# Patient Record
Sex: MALE | Race: WHITE | NOT HISPANIC OR LATINO | Employment: OTHER | URBAN - METROPOLITAN AREA
[De-identification: names, ages, dates, MRNs, and addresses within clinical notes are randomized per-mention and may not be internally consistent; named-entity substitution may affect disease eponyms.]

---

## 2017-02-25 ENCOUNTER — HOSPITAL ENCOUNTER (EMERGENCY)
Facility: HOSPITAL | Age: 42
Discharge: HOME | End: 2017-02-25
Attending: EMERGENCY MEDICINE | Admitting: EMERGENCY MEDICINE

## 2017-02-25 VITALS
HEART RATE: 118 BPM | SYSTOLIC BLOOD PRESSURE: 157 MMHG | BODY MASS INDEX: 23.14 KG/M2 | DIASTOLIC BLOOD PRESSURE: 85 MMHG | WEIGHT: 190 LBS | HEIGHT: 76 IN | TEMPERATURE: 96.5 F | OXYGEN SATURATION: 94 % | RESPIRATION RATE: 16 BRPM

## 2017-02-25 DIAGNOSIS — B86 SCABIES: Primary | ICD-10-CM

## 2017-02-25 PROCEDURE — 99282 EMERGENCY DEPT VISIT SF MDM: CPT

## 2017-02-25 RX ORDER — METHADONE HYDROCHLORIDE 10 MG/5ML
115 SOLUTION ORAL DAILY
COMMUNITY

## 2017-02-25 RX ORDER — INSULIN GLARGINE 100 [IU]/ML
20 INJECTION, SOLUTION SUBCUTANEOUS
COMMUNITY
End: 2017-07-04 | Stop reason: HOSPADM

## 2017-02-25 RX ORDER — PERMETHRIN 50 MG/G
1 CREAM TOPICAL ONCE
Qty: 30 G | Refills: 0 | Status: SHIPPED | OUTPATIENT
Start: 2017-02-25 | End: 2017-02-25

## 2017-07-03 ENCOUNTER — HOSPITAL ENCOUNTER (OUTPATIENT)
Facility: HOSPITAL | Age: 42
Setting detail: OBSERVATION
Discharge: HOME | End: 2017-07-04
Attending: EMERGENCY MEDICINE | Admitting: INTERNAL MEDICINE
Payer: COMMERCIAL

## 2017-07-03 DIAGNOSIS — Z91.89 AT RISK FOR HYPOGLYCEMIA: ICD-10-CM

## 2017-07-03 DIAGNOSIS — R11.10 INTRACTABLE VOMITING: Primary | ICD-10-CM

## 2017-07-03 PROBLEM — F11.20 NARCOTIC DEPENDENCY, CONTINUOUS (HCC): Status: ACTIVE | Noted: 2017-07-03

## 2017-07-03 PROBLEM — E16.2 HYPOGLYCEMIA: Status: ACTIVE | Noted: 2017-07-03

## 2017-07-03 PROBLEM — E10.9 TYPE 1 DIABETES MELLITUS (HCC): Status: ACTIVE | Noted: 2017-07-03

## 2017-07-03 PROBLEM — K52.9 GASTROENTERITIS, ACUTE: Status: ACTIVE | Noted: 2017-07-03

## 2017-07-03 LAB
ALBUMIN SERPL BCP-MCNC: 4.5 G/DL (ref 3.5–5)
ALP SERPL-CCNC: 120 U/L (ref 46–116)
ALT SERPL W P-5'-P-CCNC: 62 U/L (ref 12–78)
ANION GAP SERPL CALCULATED.3IONS-SCNC: 13 MMOL/L (ref 4–13)
AST SERPL W P-5'-P-CCNC: 48 U/L (ref 5–45)
BASOPHILS # BLD AUTO: 0.03 THOUSANDS/ΜL (ref 0–0.1)
BASOPHILS NFR BLD AUTO: 0 % (ref 0–1)
BILIRUB SERPL-MCNC: 0.4 MG/DL (ref 0.2–1)
BUN SERPL-MCNC: 8 MG/DL (ref 5–25)
CALCIUM SERPL-MCNC: 9.5 MG/DL (ref 8.3–10.1)
CHLORIDE SERPL-SCNC: 100 MMOL/L (ref 100–108)
CO2 SERPL-SCNC: 29 MMOL/L (ref 21–32)
CREAT SERPL-MCNC: 0.81 MG/DL (ref 0.6–1.3)
EOSINOPHIL # BLD AUTO: 0.02 THOUSAND/ΜL (ref 0–0.61)
EOSINOPHIL NFR BLD AUTO: 0 % (ref 0–6)
ERYTHROCYTE [DISTWIDTH] IN BLOOD BY AUTOMATED COUNT: 12.6 % (ref 11.6–15.1)
GFR SERPL CREATININE-BSD FRML MDRD: >60 ML/MIN/1.73SQ M
GLUCOSE SERPL-MCNC: 108 MG/DL (ref 65–140)
GLUCOSE SERPL-MCNC: 109 MG/DL (ref 65–140)
GLUCOSE SERPL-MCNC: 190 MG/DL (ref 65–140)
GLUCOSE SERPL-MCNC: 200 MG/DL (ref 65–140)
GLUCOSE SERPL-MCNC: 235 MG/DL (ref 65–140)
GLUCOSE SERPL-MCNC: 60 MG/DL (ref 65–140)
GLUCOSE SERPL-MCNC: 71 MG/DL (ref 65–140)
GLUCOSE SERPL-MCNC: 75 MG/DL (ref 65–140)
GLUCOSE SERPL-MCNC: 93 MG/DL (ref 65–140)
HCT VFR BLD AUTO: 47.8 % (ref 36.5–49.3)
HGB BLD-MCNC: 16.7 G/DL (ref 12–17)
LIPASE SERPL-CCNC: 48 U/L (ref 73–393)
LYMPHOCYTES # BLD AUTO: 1.43 THOUSANDS/ΜL (ref 0.6–4.47)
LYMPHOCYTES NFR BLD AUTO: 17 % (ref 14–44)
MCH RBC QN AUTO: 31.2 PG (ref 26.8–34.3)
MCHC RBC AUTO-ENTMCNC: 34.9 G/DL (ref 31.4–37.4)
MCV RBC AUTO: 89 FL (ref 82–98)
MONOCYTES # BLD AUTO: 0.42 THOUSAND/ΜL (ref 0.17–1.22)
MONOCYTES NFR BLD AUTO: 5 % (ref 4–12)
NEUTROPHILS # BLD AUTO: 6.31 THOUSANDS/ΜL (ref 1.85–7.62)
NEUTS SEG NFR BLD AUTO: 78 % (ref 43–75)
PLATELET # BLD AUTO: 280 THOUSANDS/UL (ref 149–390)
PMV BLD AUTO: 10 FL (ref 8.9–12.7)
POTASSIUM SERPL-SCNC: 3.7 MMOL/L (ref 3.5–5.3)
PROT SERPL-MCNC: 8.3 G/DL (ref 6.4–8.2)
RBC # BLD AUTO: 5.36 MILLION/UL (ref 3.88–5.62)
SODIUM SERPL-SCNC: 142 MMOL/L (ref 136–145)
WBC # BLD AUTO: 8.21 THOUSAND/UL (ref 4.31–10.16)

## 2017-07-03 PROCEDURE — 96376 TX/PRO/DX INJ SAME DRUG ADON: CPT

## 2017-07-03 PROCEDURE — 36415 COLL VENOUS BLD VENIPUNCTURE: CPT | Performed by: EMERGENCY MEDICINE

## 2017-07-03 PROCEDURE — 82948 REAGENT STRIP/BLOOD GLUCOSE: CPT

## 2017-07-03 PROCEDURE — 85025 COMPLETE CBC W/AUTO DIFF WBC: CPT | Performed by: EMERGENCY MEDICINE

## 2017-07-03 PROCEDURE — 80053 COMPREHEN METABOLIC PANEL: CPT | Performed by: EMERGENCY MEDICINE

## 2017-07-03 PROCEDURE — 96361 HYDRATE IV INFUSION ADD-ON: CPT

## 2017-07-03 PROCEDURE — 83690 ASSAY OF LIPASE: CPT | Performed by: EMERGENCY MEDICINE

## 2017-07-03 PROCEDURE — 96375 TX/PRO/DX INJ NEW DRUG ADDON: CPT

## 2017-07-03 PROCEDURE — 99285 EMERGENCY DEPT VISIT HI MDM: CPT

## 2017-07-03 PROCEDURE — 96374 THER/PROPH/DIAG INJ IV PUSH: CPT

## 2017-07-03 PROCEDURE — 93005 ELECTROCARDIOGRAM TRACING: CPT | Performed by: EMERGENCY MEDICINE

## 2017-07-03 RX ORDER — DEXTROSE MONOHYDRATE 25 G/50ML
INJECTION, SOLUTION INTRAVENOUS
Status: COMPLETED
Start: 2017-07-03 | End: 2017-07-03

## 2017-07-03 RX ORDER — DEXTROSE MONOHYDRATE 25 G/50ML
25 INJECTION, SOLUTION INTRAVENOUS ONCE
Status: COMPLETED | OUTPATIENT
Start: 2017-07-03 | End: 2017-07-03

## 2017-07-03 RX ORDER — ONDANSETRON 2 MG/ML
4 INJECTION INTRAMUSCULAR; INTRAVENOUS ONCE
Status: COMPLETED | OUTPATIENT
Start: 2017-07-03 | End: 2017-07-03

## 2017-07-03 RX ORDER — ONDANSETRON 2 MG/ML
4 INJECTION INTRAMUSCULAR; INTRAVENOUS EVERY 6 HOURS PRN
Status: DISCONTINUED | OUTPATIENT
Start: 2017-07-03 | End: 2017-07-04 | Stop reason: HOSPADM

## 2017-07-03 RX ORDER — METHADONE HYDROCHLORIDE 10 MG/1
110 TABLET ORAL DAILY
Status: DISCONTINUED | OUTPATIENT
Start: 2017-07-03 | End: 2017-07-04 | Stop reason: HOSPADM

## 2017-07-03 RX ORDER — DEXTROSE AND SODIUM CHLORIDE 5; .9 G/100ML; G/100ML
75 INJECTION, SOLUTION INTRAVENOUS CONTINUOUS
Status: DISCONTINUED | OUTPATIENT
Start: 2017-07-03 | End: 2017-07-04 | Stop reason: HOSPADM

## 2017-07-03 RX ORDER — DIPHENHYDRAMINE HYDROCHLORIDE 50 MG/ML
25 INJECTION INTRAMUSCULAR; INTRAVENOUS ONCE
Status: COMPLETED | OUTPATIENT
Start: 2017-07-03 | End: 2017-07-03

## 2017-07-03 RX ORDER — DEXTROSE MONOHYDRATE 25 G/50ML
50 INJECTION, SOLUTION INTRAVENOUS ONCE
Status: COMPLETED | OUTPATIENT
Start: 2017-07-03 | End: 2017-07-03

## 2017-07-03 RX ORDER — METOCLOPRAMIDE HYDROCHLORIDE 5 MG/ML
10 INJECTION INTRAMUSCULAR; INTRAVENOUS ONCE
Status: COMPLETED | OUTPATIENT
Start: 2017-07-03 | End: 2017-07-03

## 2017-07-03 RX ORDER — NICOTINE 21 MG/24HR
1 PATCH, TRANSDERMAL 24 HOURS TRANSDERMAL DAILY
Status: DISCONTINUED | OUTPATIENT
Start: 2017-07-03 | End: 2017-07-04 | Stop reason: HOSPADM

## 2017-07-03 RX ADMIN — DEXTROSE MONOHYDRATE 25 ML: 25 INJECTION, SOLUTION INTRAVENOUS at 10:20

## 2017-07-03 RX ADMIN — DEXTROSE AND SODIUM CHLORIDE 125 ML/HR: 5; .9 INJECTION, SOLUTION INTRAVENOUS at 13:02

## 2017-07-03 RX ADMIN — DEXTROSE MONOHYDRATE 50 ML: 25 INJECTION, SOLUTION INTRAVENOUS at 13:01

## 2017-07-03 RX ADMIN — SODIUM CHLORIDE 1000 ML: 0.9 INJECTION, SOLUTION INTRAVENOUS at 10:20

## 2017-07-03 RX ADMIN — METHADONE HYDROCHLORIDE 110 MG: 10 TABLET ORAL at 17:05

## 2017-07-03 RX ADMIN — SODIUM CHLORIDE 1000 ML: 0.9 INJECTION, SOLUTION INTRAVENOUS at 11:46

## 2017-07-03 RX ADMIN — DEXTROSE AND SODIUM CHLORIDE 125 ML/HR: 5; .9 INJECTION, SOLUTION INTRAVENOUS at 17:20

## 2017-07-03 RX ADMIN — NICOTINE 1 PATCH: 14 PATCH, EXTENDED RELEASE TRANSDERMAL at 17:06

## 2017-07-03 RX ADMIN — METOCLOPRAMIDE 10 MG: 5 INJECTION, SOLUTION INTRAMUSCULAR; INTRAVENOUS at 11:46

## 2017-07-03 RX ADMIN — ONDANSETRON 4 MG: 2 INJECTION INTRAMUSCULAR; INTRAVENOUS at 10:20

## 2017-07-03 RX ADMIN — DIPHENHYDRAMINE HYDROCHLORIDE 25 MG: 50 INJECTION, SOLUTION INTRAMUSCULAR; INTRAVENOUS at 11:55

## 2017-07-03 RX ADMIN — ENOXAPARIN SODIUM 40 MG: 40 INJECTION SUBCUTANEOUS at 17:05

## 2017-07-03 RX ADMIN — ONDANSETRON 4 MG: 2 INJECTION INTRAMUSCULAR; INTRAVENOUS at 11:05

## 2017-07-04 VITALS
WEIGHT: 205.25 LBS | HEIGHT: 76 IN | DIASTOLIC BLOOD PRESSURE: 76 MMHG | OXYGEN SATURATION: 98 % | TEMPERATURE: 98 F | RESPIRATION RATE: 20 BRPM | BODY MASS INDEX: 24.99 KG/M2 | SYSTOLIC BLOOD PRESSURE: 121 MMHG | HEART RATE: 72 BPM

## 2017-07-04 LAB
ATRIAL RATE: 89 BPM
GLUCOSE SERPL-MCNC: 106 MG/DL (ref 65–140)
GLUCOSE SERPL-MCNC: 173 MG/DL (ref 65–140)
GLUCOSE SERPL-MCNC: 56 MG/DL (ref 65–140)
GLUCOSE SERPL-MCNC: 81 MG/DL (ref 65–140)
P AXIS: 70 DEGREES
PR INTERVAL: 140 MS
QRS AXIS: 83 DEGREES
QRSD INTERVAL: 82 MS
QT INTERVAL: 366 MS
QTC INTERVAL: 445 MS
T WAVE AXIS: 64 DEGREES
VENTRICULAR RATE: 89 BPM

## 2017-07-04 PROCEDURE — 82948 REAGENT STRIP/BLOOD GLUCOSE: CPT

## 2017-07-04 RX ORDER — ONDANSETRON 4 MG/1
4 TABLET, FILM COATED ORAL EVERY 8 HOURS PRN
Qty: 12 TABLET | Refills: 0 | Status: SHIPPED | OUTPATIENT
Start: 2017-07-04 | End: 2017-10-15

## 2017-10-15 ENCOUNTER — HOSPITAL ENCOUNTER (EMERGENCY)
Facility: HOSPITAL | Age: 42
Discharge: HOME | End: 2017-10-15
Admitting: EMERGENCY MEDICINE
Payer: COMMERCIAL

## 2017-10-15 VITALS
BODY MASS INDEX: 23.14 KG/M2 | OXYGEN SATURATION: 99 % | WEIGHT: 190 LBS | DIASTOLIC BLOOD PRESSURE: 88 MMHG | HEIGHT: 76 IN | RESPIRATION RATE: 20 BRPM | TEMPERATURE: 97.3 F | SYSTOLIC BLOOD PRESSURE: 145 MMHG | HEART RATE: 74 BPM

## 2017-10-15 DIAGNOSIS — R11.2 NAUSEA AND VOMITING: Primary | ICD-10-CM

## 2017-10-15 DIAGNOSIS — R74.01 TRANSAMINITIS: ICD-10-CM

## 2017-10-15 LAB
ACETONE SERPL-MCNC: NEGATIVE MG/DL
ALBUMIN SERPL BCP-MCNC: 4.6 G/DL (ref 3.5–5)
ALP SERPL-CCNC: 116 U/L (ref 46–116)
ALT SERPL W P-5'-P-CCNC: 160 U/L (ref 12–78)
ANION GAP SERPL CALCULATED.3IONS-SCNC: 13 MMOL/L (ref 4–13)
AST SERPL W P-5'-P-CCNC: 81 U/L (ref 5–45)
BACTERIA UR QL AUTO: ABNORMAL /HPF
BASOPHILS # BLD AUTO: 0.03 THOUSANDS/ΜL (ref 0–0.1)
BASOPHILS NFR BLD AUTO: 1 % (ref 0–1)
BILIRUB SERPL-MCNC: 0.8 MG/DL (ref 0.2–1)
BILIRUB UR QL STRIP: NEGATIVE
BUN SERPL-MCNC: 8 MG/DL (ref 5–25)
CALCIUM SERPL-MCNC: 9 MG/DL (ref 8.3–10.1)
CHLORIDE SERPL-SCNC: 92 MMOL/L (ref 100–108)
CLARITY UR: CLEAR
CO2 SERPL-SCNC: 32 MMOL/L (ref 21–32)
COLOR UR: YELLOW
CREAT SERPL-MCNC: 0.83 MG/DL (ref 0.6–1.3)
EOSINOPHIL # BLD AUTO: 0 THOUSAND/ΜL (ref 0–0.61)
EOSINOPHIL NFR BLD AUTO: 0 % (ref 0–6)
ERYTHROCYTE [DISTWIDTH] IN BLOOD BY AUTOMATED COUNT: 14 % (ref 11.6–15.1)
GFR SERPL CREATININE-BSD FRML MDRD: 111 ML/MIN/1.73SQ M
GLUCOSE SERPL-MCNC: 241 MG/DL (ref 65–140)
GLUCOSE SERPL-MCNC: 244 MG/DL (ref 65–140)
GLUCOSE UR STRIP-MCNC: ABNORMAL MG/DL
HCT VFR BLD AUTO: 44.9 % (ref 36.5–49.3)
HGB BLD-MCNC: 15.7 G/DL (ref 12–17)
HGB UR QL STRIP.AUTO: NEGATIVE
KETONES UR STRIP-MCNC: ABNORMAL MG/DL
LEUKOCYTE ESTERASE UR QL STRIP: ABNORMAL
LYMPHOCYTES # BLD AUTO: 0.7 THOUSANDS/ΜL (ref 0.6–4.47)
LYMPHOCYTES NFR BLD AUTO: 14 % (ref 14–44)
MCH RBC QN AUTO: 32 PG (ref 26.8–34.3)
MCHC RBC AUTO-ENTMCNC: 35 G/DL (ref 31.4–37.4)
MCV RBC AUTO: 91 FL (ref 82–98)
MONOCYTES # BLD AUTO: 0.3 THOUSAND/ΜL (ref 0.17–1.22)
MONOCYTES NFR BLD AUTO: 6 % (ref 4–12)
MUCOUS THREADS UR QL AUTO: ABNORMAL
NEUTROPHILS # BLD AUTO: 3.87 THOUSANDS/ΜL (ref 1.85–7.62)
NEUTS SEG NFR BLD AUTO: 79 % (ref 43–75)
NITRITE UR QL STRIP: NEGATIVE
NON-SQ EPI CELLS URNS QL MICRO: ABNORMAL /HPF
PH UR STRIP.AUTO: 7.5 [PH] (ref 4.5–8)
PLATELET # BLD AUTO: 223 THOUSANDS/UL (ref 149–390)
PMV BLD AUTO: 10.3 FL (ref 8.9–12.7)
POTASSIUM SERPL-SCNC: 3.8 MMOL/L (ref 3.5–5.3)
PROT SERPL-MCNC: 8.6 G/DL (ref 6.4–8.2)
PROT UR STRIP-MCNC: ABNORMAL MG/DL
RBC # BLD AUTO: 4.91 MILLION/UL (ref 3.88–5.62)
RBC #/AREA URNS AUTO: ABNORMAL /HPF
SODIUM SERPL-SCNC: 137 MMOL/L (ref 136–145)
SP GR UR STRIP.AUTO: 1.01 (ref 1–1.03)
UROBILINOGEN UR QL STRIP.AUTO: 1 E.U./DL
WBC # BLD AUTO: 4.9 THOUSAND/UL (ref 4.31–10.16)
WBC #/AREA URNS AUTO: ABNORMAL /HPF

## 2017-10-15 PROCEDURE — 96376 TX/PRO/DX INJ SAME DRUG ADON: CPT

## 2017-10-15 PROCEDURE — 36415 COLL VENOUS BLD VENIPUNCTURE: CPT | Performed by: PHYSICIAN ASSISTANT

## 2017-10-15 PROCEDURE — 80053 COMPREHEN METABOLIC PANEL: CPT | Performed by: PHYSICIAN ASSISTANT

## 2017-10-15 PROCEDURE — 99283 EMERGENCY DEPT VISIT LOW MDM: CPT

## 2017-10-15 PROCEDURE — 85025 COMPLETE CBC W/AUTO DIFF WBC: CPT | Performed by: PHYSICIAN ASSISTANT

## 2017-10-15 PROCEDURE — 96374 THER/PROPH/DIAG INJ IV PUSH: CPT

## 2017-10-15 PROCEDURE — 96361 HYDRATE IV INFUSION ADD-ON: CPT

## 2017-10-15 PROCEDURE — 82948 REAGENT STRIP/BLOOD GLUCOSE: CPT

## 2017-10-15 PROCEDURE — 81001 URINALYSIS AUTO W/SCOPE: CPT | Performed by: PHYSICIAN ASSISTANT

## 2017-10-15 PROCEDURE — 82009 KETONE BODYS QUAL: CPT | Performed by: PHYSICIAN ASSISTANT

## 2017-10-15 RX ORDER — ONDANSETRON 2 MG/ML
4 INJECTION INTRAMUSCULAR; INTRAVENOUS ONCE
Status: COMPLETED | OUTPATIENT
Start: 2017-10-15 | End: 2017-10-15

## 2017-10-15 RX ORDER — ONDANSETRON 4 MG/1
4 TABLET, ORALLY DISINTEGRATING ORAL EVERY 4 HOURS PRN
Qty: 20 TABLET | Refills: 0 | Status: SHIPPED | OUTPATIENT
Start: 2017-10-15 | End: 2017-10-27 | Stop reason: SDUPTHER

## 2017-10-15 RX ADMIN — ONDANSETRON 4 MG: 2 INJECTION INTRAMUSCULAR; INTRAVENOUS at 10:43

## 2017-10-15 RX ADMIN — SODIUM CHLORIDE 1000 ML: 0.9 INJECTION, SOLUTION INTRAVENOUS at 10:40

## 2017-10-15 RX ADMIN — SODIUM CHLORIDE 1000 ML: 0.9 INJECTION, SOLUTION INTRAVENOUS at 11:36

## 2017-10-15 RX ADMIN — ONDANSETRON 4 MG: 2 INJECTION INTRAMUSCULAR; INTRAVENOUS at 11:35

## 2017-10-15 NOTE — DISCHARGE INSTRUCTIONS
Rest, clear liquid diet for next 24 hours, then slowly advance food  Take zofran as needed for nausea, monitor your blood sugar closely  Return to ER if symptoms worsen or fevers  Follow up with family doctor for recheck of liver function tests  Acute Nausea and Vomiting   WHAT YOU NEED TO KNOW:   Acute nausea and vomiting start suddenly, worsen quickly, and last a short time  DISCHARGE INSTRUCTIONS:   Return to the emergency department if:   · You see blood in your vomit or your bowel movements  · You have sudden, severe pain in your chest and upper abdomen after hard vomiting or retching  · You have swelling in your neck and chest      · You are dizzy, cold, and thirsty and your eyes and mouth are dry  · You are urinating very little or not at all  · You have muscle weakness, leg cramps, and trouble breathing  · Your heart is beating much faster than normal      · You continue to vomit for more than 48 hours  Contact your healthcare provider if:   · You have frequent dry heaves (vomiting but nothing comes out)  · Your nausea and vomiting does not get better or go away after you use medicine  · You have questions or concerns about your condition or treatment  Medicines: You may need any of the following:  · Medicines  may be given to calm your stomach and stop your vomiting  You may also need medicines to help you feel more relaxed or to stop nausea and vomiting caused by motion sickness  · Gastrointestinal stimulants  are used to help empty your stomach and bowels  This may help decrease nausea and vomiting  · Take your medicine as directed  Contact your healthcare provider if you think your medicine is not helping or if you have side effects  Tell him or her if you are allergic to any medicine  Keep a list of the medicines, vitamins, and herbs you take  Include the amounts, and when and why you take them  Bring the list or the pill bottles to follow-up visits  Carry your medicine list with you in case of an emergency  Prevent or manage acute nausea and vomiting:   · Do not drink alcohol  Alcohol may upset or irritate your stomach  Too much alcohol can also cause acute nausea and vomiting  · Control stress  Headaches due to stress may cause nausea and vomiting  Find ways to relax and manage your stress  Get more rest and sleep  · Drink more liquids as directed  Vomiting can lead to dehydration  It is important to drink more liquids to help replace lost body fluids  Ask your healthcare provider how much liquid to drink each day and which liquids are best for you  Your provider may recommend that you drink an oral rehydration solution (ORS)  ORS contains water, salts, and sugar that are needed to replace the lost body fluids  Ask what kind of ORS to use, how much to drink, and where to get it  · Eat smaller meals, more often  Eat small amounts of food every 2 to 3 hours, even if you are not hungry  Food in your stomach may decrease your nausea  · Talk to your healthcare provider before you take over-the-counter (OTC) medicines  These medicines can cause serious problems if you use certain other medicines, or you have a medical condition  You may have problems if you use too much or use them for longer than the label says  Follow directions on the label carefully  Follow up with your healthcare provider as directed:  Write down your questions so you remember to ask them during your follow-up visits  © 2017 [employer] [employer] Information is for End User's use only and may not be sold, redistributed or otherwise used for commercial purposes  All illustrations and images included in CareNotes® are the copyrighted property of [employer]  or [employer]  The above information is an  only  It is not intended as medical advice for individual conditions or treatments   Talk to your doctor, nurse or pharmacist before following any medical regimen to see if it is safe and effective for you  Liver Profile   AMBULATORY CARE:   A liver profile  is a group of blood tests that show how well your liver is working  The liver makes enzymes and bile that help digest food and gives your body energy  It also removes harmful material from your body, such as alcohol and other chemicals  Blood tests that are part of a liver profile:   · Liver enzyme tests  measure alanine aminotransferase (ALT), alkaline phosphatase (ALP), and aspartate aminotransferase (AST) enzymes  These tests may also include gamma-glutamyl transpeptidase (GGT)  · Liver protein tests  measure albumin and other proteins in your blood  This includes antibodies that help to fight infections  · Bilirubin tests  measure the amount of bilirubin in your blood  Bilirubin is a yellow fluid made in your body when red blood cells break down  How to get ready for the test:  Healthcare providers may tell you not to eat or drink anything, except water, after midnight  Several medicines can affect the results of your liver function tests  Ask your healthcare provider if you should wait to take your medicines until after your blood is taken  Wear a short-sleeved or loose shirt on the day of the test  This will make it easier to draw your blood  What abnormal test results mean:  Abnormal levels of liver enzymes, proteins, or bilirubin may be a sign of liver damage or disease  You may need another liver profile or other tests to find the cause of your abnormal test results  © 2017 [employer] Information is for End User's use only and may not be sold, redistributed or otherwise used for commercial purposes  All illustrations and images included in CareNotes® are the copyrighted property of [employer]  or [employer]  The above information is an  only  It is not intended as medical advice for individual conditions or treatments   Talk to your doctor, nurse or pharmacist before following any medical regimen to see if it is safe and effective for you

## 2017-10-15 NOTE — ED PROVIDER NOTES
History  Chief Complaint   Patient presents with    Vomiting     PATIENT PRESENTS TO ER STATING HE STARTED TO VOMIT DYESTERDAY, VOMITTED APPROX  12 TIMES IN 24 HOURS ROEL IS A TYPE 1 DIABETIC  Patient presents to the ED with nausea and vomiting that started last night  He denies fevers, diarrhea, or abdominal pain  He denies any sick contacts, possible bad food exposure, foreign travel or camping  He does have diabetes type I   He states his blood sugar has been normal   He did take his insulin last night, but not this morning  Patient has been unable to keep anything down  He had a similar episode 1 month ago and was admitted over night for hydration  History provided by:  Patient  Vomiting   Severity:  Moderate  Duration:  1 day  Timing:  Constant  Number of daily episodes:  Over 10  Progression:  Unchanged  Chronicity:  Recurrent  Relieved by:  Nothing  Worsened by:  Nothing  Ineffective treatments:  None tried  Associated symptoms: chills and myalgias    Associated symptoms: no abdominal pain, no cough, no diarrhea, no fever, no headaches, no sore throat and no URI    Risk factors: diabetes        Prior to Admission Medications   Prescriptions Last Dose Informant Patient Reported? Taking?   insulin aspart (NovoLOG) 100 units/mL injection   Yes No   Sig: Inject under the skin 3 (three) times a day before meals     insulin detemir (LEVEMIR) 100 units/mL subcutaneous injection   Yes No   Sig: Inject 30 Units under the skin daily at bedtime     methadone (DOLOPHINE) 10 mg tablet   Yes No   Sig: Take 115 mg by mouth daily ,       Facility-Administered Medications: None       Past Medical History:   Diagnosis Date    Diabetes mellitus ([facility] [address] )     Narcotic abuse        History reviewed  No pertinent surgical history  Family History   Problem Relation Age of Onset    Family history unknown: Yes     I have reviewed and agree with the history as documented      Social History   Substance Use Topics    Smoking status: Current Every Day Smoker    Smokeless tobacco: Never Used      Comment: 4-5 cigarettes/day    Alcohol use Yes      Comment: Socially        Review of Systems   Constitutional: Positive for chills  Negative for fever  HENT: Negative for sore throat and trouble swallowing  Eyes: Negative for visual disturbance  Respiratory: Negative for cough and shortness of breath  Cardiovascular: Negative for chest pain and leg swelling  Gastrointestinal: Positive for nausea and vomiting  Negative for abdominal pain, constipation and diarrhea  Musculoskeletal: Positive for myalgias  Skin: Negative for color change, rash and wound  Neurological: Negative for dizziness, weakness, numbness and headaches  Psychiatric/Behavioral: Negative for confusion  All other systems reviewed and are negative  Physical Exam  ED Triage Vitals   Temperature Pulse Respirations Blood Pressure SpO2   10/15/17 1038 10/15/17 1032 10/15/17 1032 10/15/17 1032 10/15/17 1032   (!) 97 3 °F (36 3 °C) 85 20 (!) 179/89 98 %      Temp Source Heart Rate Source Patient Position - Orthostatic VS BP Location FiO2 (%)   10/15/17 1038 10/15/17 1032 10/15/17 1032 10/15/17 1032 --   Temporal Monitor Lying Right arm       Pain Score       10/15/17 1030       No Pain           Physical Exam   Constitutional: He is oriented to person, place, and time  He appears well-developed and well-nourished  He is active and cooperative  He appears ill  He appears distressed  HENT:   Head: Normocephalic and atraumatic  Right Ear: Hearing normal    Left Ear: Hearing normal    Nose: Nose normal    Mouth/Throat: Mucous membranes are pale and dry  Eyes: Conjunctivae are normal  Pupils are equal, round, and reactive to light  Neck: Normal range of motion  Cardiovascular: Normal rate, regular rhythm and normal heart sounds  No murmur heard  Pulmonary/Chest: Effort normal and breath sounds normal  He has no wheezes   He has no rhonchi  He has no rales  Abdominal: Soft  Normal appearance and bowel sounds are normal  There is no tenderness  Musculoskeletal: Normal range of motion  He exhibits no tenderness or deformity  Neurological: He is alert and oriented to person, place, and time  He has normal strength  No cranial nerve deficit or sensory deficit  Gait normal  GCS eye subscore is 4  GCS verbal subscore is 5  GCS motor subscore is 6  Skin: Skin is warm and dry  No rash noted  He is not diaphoretic  No pallor  Psychiatric: He has a normal mood and affect  His speech is normal  Cognition and memory are normal    Nursing note and vitals reviewed        ED Medications  Medications   sodium chloride 0 9 % bolus 1,000 mL (0 mL Intravenous Stopped 10/15/17 1132)   ondansetron (ZOFRAN) injection 4 mg (4 mg Intravenous Given 10/15/17 1043)   sodium chloride 0 9 % bolus 1,000 mL (1,000 mL Intravenous New Bag 10/15/17 1136)   ondansetron (ZOFRAN) injection 4 mg (4 mg Intravenous Given 10/15/17 1135)       Diagnostic Studies  Labs Reviewed   CBC AND DIFFERENTIAL - Abnormal        Result Value Ref Range Status    Neutrophils Relative 79 (*) 43 - 75 % Final    WBC 4 90  4 31 - 10 16 Thousand/uL Final    RBC 4 91  3 88 - 5 62 Million/uL Final    Hemoglobin 15 7  12 0 - 17 0 g/dL Final    Hematocrit 44 9  36 5 - 49 3 % Final    MCV 91  82 - 98 fL Final    MCH 32 0  26 8 - 34 3 pg Final    MCHC 35 0  31 4 - 37 4 g/dL Final    RDW 14 0  11 6 - 15 1 % Final    MPV 10 3  8 9 - 12 7 fL Final    Platelets 206  587 - 390 Thousands/uL Final    Lymphocytes Relative 14  14 - 44 % Final    Monocytes Relative 6  4 - 12 % Final    Eosinophils Relative 0  0 - 6 % Final    Basophils Relative 1  0 - 1 % Final    Neutrophils Absolute 3 87  1 85 - 7 62 Thousands/µL Final    Lymphocytes Absolute 0 70  0 60 - 4 47 Thousands/µL Final    Monocytes Absolute 0 30  0 17 - 1 22 Thousand/µL Final    Eosinophils Absolute 0 00  0 00 - 0 61 Thousand/µL Final    Basophils Absolute 0 03  0 00 - 0 10 Thousands/µL Final   COMPREHENSIVE METABOLIC PANEL - Abnormal     Chloride 92 (*) 100 - 108 mmol/L Final    Glucose 244 (*) 65 - 140 mg/dL Final    Comment:   If the patient is fasting, the ADA then defines impaired fasting glucose as > 100 mg/dL and diabetes as > or equal to 123 mg/dL  Specimen collection should occur prior to Sulfasalazine administration due to the potential for falsely depressed results  Specimen collection should occur prior to Sulfapyridine administration due to the potential for falsely elevated results  AST 81 (*) 5 - 45 U/L Final    Comment:   Specimen collection should occur prior to Sulfasalazine administration due to the potential for falsely depressed results   (*) 12 - 78 U/L Final    Comment:   Specimen collection should occur prior to Sulfasalazine administration due to the potential for falsely depressed results  Total Protein 8 6 (*) 6 4 - 8 2 g/dL Final    Sodium 137  136 - 145 mmol/L Final    Potassium 3 8  3 5 - 5 3 mmol/L Final    CO2 32  21 - 32 mmol/L Final    Anion Gap 13  4 - 13 mmol/L Final    BUN 8  5 - 25 mg/dL Final    Creatinine 0 83  0 60 - 1 30 mg/dL Final    Comment: Standardized to IDMS reference method    Calcium 9 0  8 3 - 10 1 mg/dL Final    Alkaline Phosphatase 116  46 - 116 U/L Final    Albumin 4 6  3 5 - 5 0 g/dL Final    Total Bilirubin 0 80  0 20 - 1 00 mg/dL Final    eGFR 111  ml/min/1 73sq m Final    Narrative:     National Kidney Disease Education Program recommendations are as follows:  GFR calculation is accurate only with a steady state creatinine  Chronic Kidney disease less than 60 ml/min/1 73 sq  meters  Kidney failure less than 15 ml/min/1 73 sq  meters  UA W REFLEX TO MICROSCOPIC WITH REFLEX TO CULTURE - Abnormal     Leukocytes, UA   (*) Negative Final    Value: Elevated glucose may cause decreased leukocyte values   See urine microscopic for [facility] result/    Protein, UA Trace (*) Negative mg/dl Final Glucose, UA >=1000 (1%) (*) Negative mg/dl Final    Ketones, UA 40 (2+) (*) Negative mg/dl Final    Color, UA Yellow   Final    Clarity, UA Clear   Final    Specific [place], UA 1 010  1 003 - 1 030 Final    pH, UA 7 5  4 5 - 8 0 Final    Nitrite, UA Negative  Negative Final    Urobilinogen, UA 1 0  0 2, 1 0 E U /dl E U /dl Final    Bilirubin, UA Negative  Negative Final    Blood, UA Negative  Negative Final   URINE MICROSCOPIC - Abnormal     RBC, UA 0-1 (*) None Seen, 0-5 /hpf Final    WBC, UA 0-1 (*) None Seen, 0-5, 5-55, 5-65 /hpf Final    Epithelial Cells None Seen  None Seen, Occasional /hpf Final    Bacteria, UA None Seen  None Seen, Occasional /hpf Final    MUCOUS THREADS Occasional  Occasional, Moderate, Innumerable Final   POCT GLUCOSE - Abnormal     POC Glucose 241 (*) 65 - 140 mg/dl Final   ACETONE - Normal    Acetone, Bld Negative  Negative Final       No orders to display       Procedures  Procedures      Phone Contacts  ED Phone Contact    ED Course  ED Course as of Oct 15 1250   [place] Oct 15, 2017   1132 Patient states he still feels nauseated, will give more zofran and another liter of fluid  1247 Patient feeling better, able to tolerate po, recheck BS is 199, will discharge  MDM  Number of Diagnoses or Management Options  Nausea and vomiting: new and requires workup  Transaminitis: new and requires workup  Diagnosis management comments: Patient with nausea and vomiting, will check labs to r/o DKA  Patient improved with fluids and zofran, will discharge  Patient instructed to monitor BS closely and return if symptoms worsen  He was also instructed to avoid alcohol and f/u with PCP for recheck of LFTs          Amount and/or Complexity of Data Reviewed  Clinical lab tests: ordered and reviewed    Patient Progress  Patient progress: improved    CritCare Time    Disposition  Final diagnoses:   Nausea and vomiting   Transaminitis     ED Disposition     ED Disposition Condition Comment    Discharge  Nicole PICKETT  81  discharge to home/self care  Condition at discharge: Stable        Follow-up Information     Follow up With Specialties Details Why Contact Info    Jose Dunn,   In 2 days For recheck Andreanthony  [phone]          Patient's Medications   Discharge Prescriptions    ONDANSETRON (ZOFRAN-ODT) 4 MG DISINTEGRATING TABLET    Take 1 tablet by mouth every 4 (four) hours as needed for nausea       Start Date: 10/15/2017End Date: --       Order Dose: 4 mg       Quantity: 20 tablet    Refills: 0     No discharge procedures on file      ED Provider  Electronically Signed by       Whit Templeton PA-C  10/15/17 8045

## 2017-10-16 LAB — GLUCOSE SERPL-MCNC: 199 MG/DL (ref 65–140)

## 2017-10-20 ENCOUNTER — HOSPITAL ENCOUNTER (EMERGENCY)
Facility: HOSPITAL | Age: 42
Discharge: HOME | End: 2017-10-20
Admitting: EMERGENCY MEDICINE
Payer: COMMERCIAL

## 2017-10-20 VITALS
WEIGHT: 190 LBS | TEMPERATURE: 96.7 F | DIASTOLIC BLOOD PRESSURE: 90 MMHG | RESPIRATION RATE: 16 BRPM | OXYGEN SATURATION: 98 % | SYSTOLIC BLOOD PRESSURE: 146 MMHG | HEART RATE: 72 BPM | BODY MASS INDEX: 23.14 KG/M2 | HEIGHT: 76 IN

## 2017-10-20 DIAGNOSIS — R74.01 TRANSAMINITIS: ICD-10-CM

## 2017-10-20 DIAGNOSIS — R11.2 NAUSEA AND VOMITING: Primary | ICD-10-CM

## 2017-10-20 LAB
ACETONE SERPL-MCNC: ABNORMAL MG/DL
ALBUMIN SERPL BCP-MCNC: 4.3 G/DL (ref 3.5–5)
ALP SERPL-CCNC: 122 U/L (ref 46–116)
ALT SERPL W P-5'-P-CCNC: 117 U/L (ref 12–78)
ANION GAP SERPL CALCULATED.3IONS-SCNC: 14 MMOL/L (ref 4–13)
ANION GAP SERPL CALCULATED.3IONS-SCNC: 20 MMOL/L (ref 4–13)
ARTERIAL PATENCY WRIST A: NORMAL
AST SERPL W P-5'-P-CCNC: 83 U/L (ref 5–45)
BASE EXCESS BLDA CALC-SCNC: -1 MMOL/L (ref -2–3)
BASOPHILS # BLD AUTO: 0.02 THOUSANDS/ΜL (ref 0–0.1)
BASOPHILS NFR BLD AUTO: 0 % (ref 0–1)
BILIRUB SERPL-MCNC: 0.8 MG/DL (ref 0.2–1)
BUN SERPL-MCNC: 11 MG/DL (ref 5–25)
BUN SERPL-MCNC: 9 MG/DL (ref 5–25)
CALCIUM SERPL-MCNC: 8 MG/DL (ref 8.3–10.1)
CALCIUM SERPL-MCNC: 8.4 MG/DL (ref 8.3–10.1)
CHLORIDE SERPL-SCNC: 100 MMOL/L (ref 100–108)
CHLORIDE SERPL-SCNC: 96 MMOL/L (ref 100–108)
CLARITY, POC: CLEAR
CO2 SERPL-SCNC: 23 MMOL/L (ref 21–32)
CO2 SERPL-SCNC: 25 MMOL/L (ref 21–32)
COLOR, POC: YELLOW
CREAT SERPL-MCNC: 0.63 MG/DL (ref 0.6–1.3)
CREAT SERPL-MCNC: 0.71 MG/DL (ref 0.6–1.3)
EOSINOPHIL # BLD AUTO: 0.03 THOUSAND/ΜL (ref 0–0.61)
EOSINOPHIL NFR BLD AUTO: 1 % (ref 0–6)
ERYTHROCYTE [DISTWIDTH] IN BLOOD BY AUTOMATED COUNT: 14 % (ref 11.6–15.1)
EXT BILIRUBIN, UA: NORMAL
EXT BLOOD URINE: NORMAL
EXT GLUCOSE, UA: 2000
EXT KETONES: NORMAL
EXT NITRITE, UA: NORMAL
EXT PH, UA: 6
EXT PROTEIN, UA: NORMAL
EXT SPECIFIC GRAVITY, UA: 1.03
EXT UROBILINOGEN: NORMAL
FIO2 GAS DIL.REBREATH: 21 L
GFR SERPL CREATININE-BSD FRML MDRD: 118 ML/MIN/1.73SQ M
GFR SERPL CREATININE-BSD FRML MDRD: 124 ML/MIN/1.73SQ M
GLUCOSE SERPL-MCNC: 162 MG/DL (ref 65–140)
GLUCOSE SERPL-MCNC: 190 MG/DL (ref 65–140)
GLUCOSE SERPL-MCNC: 199 MG/DL (ref 65–140)
GLUCOSE SERPL-MCNC: 227 MG/DL (ref 65–140)
HCO3 BLDA-SCNC: 23.5 MMOL/L (ref 22–28)
HCT VFR BLD AUTO: 44.5 % (ref 36.5–49.3)
HGB BLD-MCNC: 15.2 G/DL (ref 12–17)
LIPASE SERPL-CCNC: 68 U/L (ref 73–393)
LYMPHOCYTES # BLD AUTO: 1.06 THOUSANDS/ΜL (ref 0.6–4.47)
LYMPHOCYTES NFR BLD AUTO: 18 % (ref 14–44)
MAGNESIUM SERPL-MCNC: 1.8 MG/DL (ref 1.6–2.6)
MCH RBC QN AUTO: 31.6 PG (ref 26.8–34.3)
MCHC RBC AUTO-ENTMCNC: 34.2 G/DL (ref 31.4–37.4)
MCV RBC AUTO: 93 FL (ref 82–98)
MONOCYTES # BLD AUTO: 0.36 THOUSAND/ΜL (ref 0.17–1.22)
MONOCYTES NFR BLD AUTO: 6 % (ref 4–12)
NEUTROPHILS # BLD AUTO: 4.42 THOUSANDS/ΜL (ref 1.85–7.62)
NEUTS SEG NFR BLD AUTO: 75 % (ref 43–75)
PCO2 BLD: 25 MMOL/L (ref 21–32)
PCO2 BLD: 38.4 MM HG (ref 36–44)
PH BLD: 7.39 [PH] (ref 7.35–7.45)
PLATELET # BLD AUTO: 191 THOUSANDS/UL (ref 149–390)
PMV BLD AUTO: 10.7 FL (ref 8.9–12.7)
PO2 BLD: 86 MM HG (ref 75–129)
POTASSIUM SERPL-SCNC: 3.8 MMOL/L (ref 3.5–5.3)
POTASSIUM SERPL-SCNC: 4.4 MMOL/L (ref 3.5–5.3)
PROT SERPL-MCNC: 7.8 G/DL (ref 6.4–8.2)
RBC # BLD AUTO: 4.81 MILLION/UL (ref 3.88–5.62)
SAMPLE SITE: NORMAL
SAO2 % BLD FROM PO2: 96 % (ref 95–98)
SODIUM SERPL-SCNC: 139 MMOL/L (ref 136–145)
SODIUM SERPL-SCNC: 139 MMOL/L (ref 136–145)
SPECIMEN SOURCE: NORMAL
WBC # BLD AUTO: 5.89 THOUSAND/UL (ref 4.31–10.16)
WBC # BLD EST: NORMAL 10*3/UL

## 2017-10-20 PROCEDURE — 96374 THER/PROPH/DIAG INJ IV PUSH: CPT

## 2017-10-20 PROCEDURE — 85025 COMPLETE CBC W/AUTO DIFF WBC: CPT

## 2017-10-20 PROCEDURE — 36415 COLL VENOUS BLD VENIPUNCTURE: CPT

## 2017-10-20 PROCEDURE — 81002 URINALYSIS NONAUTO W/O SCOPE: CPT

## 2017-10-20 PROCEDURE — 83690 ASSAY OF LIPASE: CPT

## 2017-10-20 PROCEDURE — 83735 ASSAY OF MAGNESIUM: CPT | Performed by: PHYSICIAN ASSISTANT

## 2017-10-20 PROCEDURE — 99283 EMERGENCY DEPT VISIT LOW MDM: CPT

## 2017-10-20 PROCEDURE — 96361 HYDRATE IV INFUSION ADD-ON: CPT

## 2017-10-20 PROCEDURE — 80048 BASIC METABOLIC PNL TOTAL CA: CPT | Performed by: PHYSICIAN ASSISTANT

## 2017-10-20 PROCEDURE — 82948 REAGENT STRIP/BLOOD GLUCOSE: CPT

## 2017-10-20 PROCEDURE — 96376 TX/PRO/DX INJ SAME DRUG ADON: CPT

## 2017-10-20 PROCEDURE — 36600 WITHDRAWAL OF ARTERIAL BLOOD: CPT

## 2017-10-20 PROCEDURE — 80053 COMPREHEN METABOLIC PANEL: CPT

## 2017-10-20 PROCEDURE — 82009 KETONE BODYS QUAL: CPT | Performed by: PHYSICIAN ASSISTANT

## 2017-10-20 PROCEDURE — 82803 BLOOD GASES ANY COMBINATION: CPT

## 2017-10-20 RX ORDER — LORAZEPAM 0.5 MG/1
0.5 TABLET ORAL ONCE
Status: COMPLETED | OUTPATIENT
Start: 2017-10-20 | End: 2017-10-20

## 2017-10-20 RX ORDER — ONDANSETRON 2 MG/ML
4 INJECTION INTRAMUSCULAR; INTRAVENOUS ONCE
Status: COMPLETED | OUTPATIENT
Start: 2017-10-20 | End: 2017-10-20

## 2017-10-20 RX ORDER — ONDANSETRON 4 MG/1
4 TABLET, FILM COATED ORAL EVERY 8 HOURS PRN
Qty: 9 TABLET | Refills: 0 | Status: SHIPPED | OUTPATIENT
Start: 2017-10-20 | End: 2017-11-02 | Stop reason: HOSPADM

## 2017-10-20 RX ADMIN — ONDANSETRON 4 MG: 2 INJECTION INTRAMUSCULAR; INTRAVENOUS at 10:53

## 2017-10-20 RX ADMIN — SODIUM CHLORIDE 1000 ML: 0.9 INJECTION, SOLUTION INTRAVENOUS at 10:53

## 2017-10-20 RX ADMIN — SODIUM CHLORIDE 1000 ML: 0.9 INJECTION, SOLUTION INTRAVENOUS at 12:38

## 2017-10-20 RX ADMIN — ONDANSETRON 4 MG: 2 INJECTION INTRAMUSCULAR; INTRAVENOUS at 14:53

## 2017-10-20 RX ADMIN — LORAZEPAM 0.5 MG: 0.5 TABLET ORAL at 13:22

## 2017-10-20 NOTE — DISCHARGE INSTRUCTIONS
Acute Nausea and Vomiting   WHAT YOU NEED TO KNOW:   Acute nausea and vomiting start suddenly, worsen quickly, and last a short time  DISCHARGE INSTRUCTIONS:   Return to the emergency department if:   · You see blood in your vomit or your bowel movements  · You have sudden, severe pain in your chest and upper abdomen after hard vomiting or retching  · You have swelling in your neck and chest      · You are dizzy, cold, and thirsty and your eyes and mouth are dry  · You are urinating very little or not at all  · You have muscle weakness, leg cramps, and trouble breathing  · Your heart is beating much faster than normal      · You continue to vomit for more than 48 hours  Contact your healthcare provider if:   · You have frequent dry heaves (vomiting but nothing comes out)  · Your nausea and vomiting does not get better or go away after you use medicine  · You have questions or concerns about your condition or treatment  Medicines: You may need any of the following:  · Medicines  may be given to calm your stomach and stop your vomiting  You may also need medicines to help you feel more relaxed or to stop nausea and vomiting caused by motion sickness  · Gastrointestinal stimulants  are used to help empty your stomach and bowels  This may help decrease nausea and vomiting  · Take your medicine as directed  Contact your healthcare provider if you think your medicine is not helping or if you have side effects  Tell him or her if you are allergic to any medicine  Keep a list of the medicines, vitamins, and herbs you take  Include the amounts, and when and why you take them  Bring the list or the pill bottles to follow-up visits  Carry your medicine list with you in case of an emergency  Prevent or manage acute nausea and vomiting:   · Do not drink alcohol  Alcohol may upset or irritate your stomach  Too much alcohol can also cause acute nausea and vomiting  · Control stress    Headaches due to stress may cause nausea and vomiting  Find ways to relax and manage your stress  Get more rest and sleep  · Drink more liquids as directed  Vomiting can lead to dehydration  It is important to drink more liquids to help replace lost body fluids  Ask your healthcare provider how much liquid to drink each day and which liquids are best for you  Your provider may recommend that you drink an oral rehydration solution (ORS)  ORS contains water, salts, and sugar that are needed to replace the lost body fluids  Ask what kind of ORS to use, how much to drink, and where to get it  · Eat smaller meals, more often  Eat small amounts of food every 2 to 3 hours, even if you are not hungry  Food in your stomach may decrease your nausea  · Talk to your healthcare provider before you take over-the-counter (OTC) medicines  These medicines can cause serious problems if you use certain other medicines, or you have a medical condition  You may have problems if you use too much or use them for longer than the label says  Follow directions on the label carefully  Follow up with your healthcare provider as directed:  Write down your questions so you remember to ask them during your follow-up visits  © 2017 [employer] [employer] Information is for End User's use only and may not be sold, redistributed or otherwise used for commercial purposes  All illustrations and images included in CareNotes® are the copyrighted property of [employer]  or [employer] Valles  The above information is an  only  It is not intended as medical advice for individual conditions or treatments  Talk to your doctor, nurse or pharmacist before following any medical regimen to see if it is safe and effective for you

## 2017-10-20 NOTE — ED PROVIDER NOTES
History  Chief Complaint   Patient presents with    Vomiting     Was here a few days ago and started to feel better however, vomitting recurring  Diabetic and having difficulty keeping anything down  45 yo male presents for evaluation of nausea and vomiting  Pt recently here and discharged  He was doing fine and actually reports a day where he was back to normal, then yesterday the nausea and vomiting returned  He no longer has any of the zofran given to him that helped before with the N/V  He is concerned because he is a diabetic  He denies any fevers, chills, chest pain, SOB, abd pain, diarrhea, melena, dysuria, hematuria, urgency or frequency  He also has a history of narcotic abuse  He is a current every day smoker  No known allergies  No prior surgical procedures reported  Prior to Admission Medications   Prescriptions Last Dose Informant Patient Reported? Taking?   insulin aspart (NovoLOG) 100 units/mL injection   Yes No   Sig: Inject under the skin 3 (three) times a day before meals     insulin detemir (LEVEMIR) 100 units/mL subcutaneous injection 10/20/2017 at Unknown time  Yes Yes   Sig: Inject 30 Units under the skin daily at bedtime     methadone (DOLOPHINE) 10 mg tablet 10/20/2017 at Unknown time  Yes Yes   Sig: Take 115 mg by mouth daily ,    ondansetron (ZOFRAN-ODT) 4 mg disintegrating tablet   No No   Sig: Take 1 tablet by mouth every 4 (four) hours as needed for nausea      Facility-Administered Medications: None       Past Medical History:   Diagnosis Date    Diabetes mellitus ([facility] [address] )     Narcotic abuse        History reviewed  No pertinent surgical history  Family History   Problem Relation Age of Onset    Family history unknown: Yes     I have reviewed and agree with the history as documented      Social History   Substance Use Topics    Smoking status: Current Every Day Smoker    Smokeless tobacco: Never Used      Comment: 4-5 cigarettes/day    Alcohol use Yes Comment: Socially        Review of Systems   Constitutional: Negative for activity change, appetite change, chills, fatigue and fever  HENT: Negative for congestion, postnasal drip, rhinorrhea, sore throat, trouble swallowing and voice change  Eyes: Negative for photophobia and visual disturbance  Respiratory: Negative for cough and shortness of breath  Cardiovascular: Negative for chest pain  Gastrointestinal: Positive for nausea and vomiting  Negative for abdominal distention, abdominal pain, anal bleeding, blood in stool, constipation, diarrhea and rectal pain  Endocrine: Negative for cold intolerance and heat intolerance  Genitourinary: Negative for dysuria, flank pain, frequency, hematuria and urgency  Musculoskeletal: Negative for back pain, gait problem, neck pain and neck stiffness  Skin: Negative for rash and wound  Allergic/Immunologic: Negative for food allergies  Neurological: Negative for dizziness, weakness, light-headedness, numbness and headaches  Hematological: Negative for adenopathy  Physical Exam  ED Triage Vitals   Temperature Pulse Respirations Blood Pressure SpO2   10/20/17 1100 10/20/17 1045 10/20/17 1100 10/20/17 1037 10/20/17 1037   (!) 96 7 °F (35 9 °C) 94 18 170/100 98 %      Temp Source Heart Rate Source Patient Position - Orthostatic VS BP Location FiO2 (%)   10/20/17 1100 10/20/17 1100 10/20/17 1100 10/20/17 1037 --   Tympanic Monitor Sitting Right arm       Pain Score       10/20/17 1037       No Pain           Physical Exam   Constitutional: He is oriented to person, place, and time  He appears well-developed and well-nourished  No distress  HENT:   Head: Normocephalic and atraumatic  Eyes: Conjunctivae and EOM are normal  Pupils are equal, round, and reactive to light  Neck: Normal range of motion  Neck supple  Cardiovascular: Normal rate, regular rhythm, normal heart sounds and intact distal pulses    Exam reveals no gallop and no friction rub     No murmur heard  Pulmonary/Chest: Effort normal and breath sounds normal  No respiratory distress  He has no wheezes  He has no rales  Abdominal: Soft  Bowel sounds are normal  He exhibits no distension and no mass  There is no tenderness  There is no rebound and no guarding  No hernia  Musculoskeletal: Normal range of motion  He exhibits no edema, tenderness or deformity  Neurological: He is alert and oriented to person, place, and time  Skin: Skin is warm and dry  Capillary refill takes less than 2 seconds  He is not diaphoretic  No pallor  Psychiatric: He has a normal mood and affect  His behavior is normal  Judgment and thought content normal    Vitals reviewed  ED Medications  Medications   ondansetron (ZOFRAN) injection 4 mg (4 mg Intravenous Given 10/20/17 1053)   sodium chloride 0 9 % bolus 1,000 mL (0 mL Intravenous Stopped 10/20/17 1155)   sodium chloride 0 9 % bolus 1,000 mL (0 mL Intravenous Stopped 10/20/17 1410)   LORazepam (ATIVAN) tablet 0 5 mg (0 5 mg Oral Given 10/20/17 1322)   ondansetron (ZOFRAN) injection 4 mg (4 mg Intravenous Given 10/20/17 1453)       Diagnostic Studies  Labs Reviewed   COMPREHENSIVE METABOLIC PANEL - Abnormal        Result Value Ref Range Status    Chloride 96 (*) 100 - 108 mmol/L Final    Anion Gap 20 (*) 4 - 13 mmol/L Final    Glucose 227 (*) 65 - 140 mg/dL Final    Comment:   If the patient is fasting, the ADA then defines impaired fasting glucose as > 100 mg/dL and diabetes as > or equal to 123 mg/dL  Specimen collection should occur prior to Sulfasalazine administration due to the potential for falsely depressed results  Specimen collection should occur prior to Sulfapyridine administration due to the potential for falsely elevated results  AST 83 (*) 5 - 45 U/L Final    Comment:   Specimen collection should occur prior to Sulfasalazine administration due to the potential for falsely depressed results        (*) 12 - 78 U/L Final Comment:   Specimen collection should occur prior to Sulfasalazine administration due to the potential for falsely depressed results  Alkaline Phosphatase 122 (*) 46 - 116 U/L Final    Sodium 139  136 - 145 mmol/L Final    Potassium 3 8  3 5 - 5 3 mmol/L Final    CO2 23  21 - 32 mmol/L Final    BUN 11  5 - 25 mg/dL Final    Creatinine 0 71  0 60 - 1 30 mg/dL Final    Comment: Standardized to IDMS reference method    Calcium 8 4  8 3 - 10 1 mg/dL Final    Total Protein 7 8  6 4 - 8 2 g/dL Final    Albumin 4 3  3 5 - 5 0 g/dL Final    Total Bilirubin 0 80  0 20 - 1 00 mg/dL Final    eGFR 118  ml/min/1 73sq m Final    Narrative:     National Kidney Disease Education Program recommendations are as follows:  GFR calculation is accurate only with a steady state creatinine  Chronic Kidney disease less than 60 ml/min/1 73 sq  meters  Kidney failure less than 15 ml/min/1 73 sq  meters  LIPASE - Abnormal     Lipase 68 (*) 73 - 393 u/L Final   ACETONE - Abnormal     Acetone, Bld 1+ (*) Negative Final   BASIC METABOLIC PANEL - Abnormal     Anion Gap 14 (*) 4 - 13 mmol/L Final    Glucose 190 (*) 65 - 140 mg/dL Final    Comment:   If the patient is fasting, the ADA then defines impaired fasting glucose as > 100 mg/dL and diabetes as > or equal to 123 mg/dL  Specimen collection should occur prior to Sulfasalazine administration due to the potential for falsely depressed results  Specimen collection should occur prior to Sulfapyridine administration due to the potential for falsely elevated results      Calcium 8 0 (*) 8 3 - 10 1 mg/dL Final    Sodium 139  136 - 145 mmol/L Final    Potassium 4 4  3 5 - 5 3 mmol/L Final    Chloride 100  100 - 108 mmol/L Final    CO2 25  21 - 32 mmol/L Final    BUN 9  5 - 25 mg/dL Final    Creatinine 0 63  0 60 - 1 30 mg/dL Final    Comment: Standardized to IDMS reference method    eGFR 124  ml/min/1 73sq m Final    Narrative:     National Kidney Disease Education Program recommendations are as follows:  GFR calculation is accurate only with a steady state creatinine  Chronic Kidney disease less than 60 ml/min/1 73 sq  meters  Kidney failure less than 15 ml/min/1 73 sq  meters     POCT GLUCOSE - Abnormal     POC Glucose 199 (*) 65 - 140 mg/dl Final   POCT GLUCOSE - Abnormal     POC Glucose 162 (*) 65 - 140 mg/dl Final   CBC AND DIFFERENTIAL - Normal    WBC 5 89  4 31 - 10 16 Thousand/uL Final    RBC 4 81  3 88 - 5 62 Million/uL Final    Hemoglobin 15 2  12 0 - 17 0 g/dL Final    Hematocrit 44 5  36 5 - 49 3 % Final    MCV 93  82 - 98 fL Final    MCH 31 6  26 8 - 34 3 pg Final    MCHC 34 2  31 4 - 37 4 g/dL Final    RDW 14 0  11 6 - 15 1 % Final    MPV 10 7  8 9 - 12 7 fL Final    Platelets 813  398 - 390 Thousands/uL Final    Neutrophils Relative 75  43 - 75 % Final    Lymphocytes Relative 18  14 - 44 % Final    Monocytes Relative 6  4 - 12 % Final    Eosinophils Relative 1  0 - 6 % Final    Basophils Relative 0  0 - 1 % Final    Neutrophils Absolute 4 42  1 85 - 7 62 Thousands/µL Final    Lymphocytes Absolute 1 06  0 60 - 4 47 Thousands/µL Final    Monocytes Absolute 0 36  0 17 - 1 22 Thousand/µL Final    Eosinophils Absolute 0 03  0 00 - 0 61 Thousand/µL Final    Basophils Absolute 0 02  0 00 - 0 10 Thousands/µL Final   MAGNESIUM - Normal    Magnesium 1 8  1 6 - 2 6 mg/dL Final   POCT URINALYSIS DIPSTICK - Normal    Color, UA yellow   Final    Clarity, UA clear   Final    EXT Glucose, UA 2,000   Final    EXT Bilirubin, UA (Ref: Negative) neg   Final    EXT Ketones, UA (Ref: Negative) large   Final    EXT Spec Grav, UA 1 030   Final    EXT Blood, UA (Ref: Negative) neg   Final    EXT pH, UA 6 0   Final    EXT Protein, UA (Ref: Negative) neg   Final    EXT Urobilinogen, UA (Ref: 0 2- 1 0) neg   Final    EXT Leukocytes, UA (Ref: Negative) neg   Final    EXT Nitrite, UA (Ref: Negative) neg   Final   POCT BLOOD GAS (G3+) - Normal    pH, Art i-STAT 7 395  7 350 - 7 450 Final    pCO2, Art i-STAT 38 4  36 0 - 44 0 mm HG Final    pO2, ART i-STAT 86 0  75 0 - 129 0 mm HG Final    BE, i-STAT -1  -2 - 3 mmol/L Final    HCO3, Art i-STAT 23 5  22 0 - 28 0 mmol/L Final    CO2, i-STAT 25  21 - 32 mmol/L Final    O2 Sat, i-STAT 96  95 - 98 % Final    POC FIO2 21  L Final    Specimen Type ARTERIAL   Final    SITE Right Radial   Final    GREY TEST Postive Grey Test   Final       No orders to display       Procedures  Procedures      Phone Contacts  ED Phone Contact    ED Course  ED Course as of Oct 20 1523   Fri Oct 20, 2017   1211 Acetone, Bld: (!) 1+   1219 Pt states that he drinks alcohol, at time 6 beers a day, no recent intake  1331 Pt doing well  No complaints at this time  Pending repeat BMP                                MDM  Number of Diagnoses or Management Options  Nausea and vomiting:   Transaminitis:   Diagnosis management comments: Diff dx includes but is not limited to DKA, gastritis, GERD, viral  Due to lack of abd pain, physical exam findings, low suspicion for acute intraabdominal pathology such as pancreatitis, appendicitis, cholecystitis, SBO  Will monitor, repeat leabs, evaluated  Action: labs, fluids, zofran  Results: pt with slight elevated anion gap  +1 acetone  I discussed the case with Dr Kasia Barfield  Advised to hydrate and repeat BMP for this will likely stabilize  Results: pt with normalization of labs  Plan: d/c with zofran, fluids, pcp f/u in 2-3 days  Return precautions given  Amount and/or Complexity of Data Reviewed  Clinical lab tests: ordered and reviewed  Review and summarize past medical records: yes  Discuss the patient with other providers: yes  Independent visualization of images, tracings, or specimens: yes      CritCare Time    Disposition  Final diagnoses:   Nausea and vomiting   Transaminitis     ED Disposition     ED Disposition Condition Comment    Discharge  Sushma Brewer discharge to home/self care      Condition at discharge: Good        Follow-up Information Follow up With Specialties Details Why Contact Info Additional [phone] [facility] Emergency Department Emergency Medicine Go to If symptoms worsen [phone]   [id number]  [phone] [address] [address] ED, [address], [address] [facility] [address], DO  Schedule an appointment as soon as possible for a visit in 1-3 days for follow up  Bobby  [phone]           Discharge Medication List as of 10/20/2017  2:47 PM      START taking these medications    Details   ondansetron (ZOFRAN) 4 mg tablet Take 1 tablet by mouth every 8 (eight) hours as needed for nausea or vomiting for up to 7 days, Starting Fri 10/20/2017, Until Fri 10/27/2017, Print         CONTINUE these medications which have NOT CHANGED    Details   insulin detemir (LEVEMIR) 100 units/mL subcutaneous injection Inject 30 Units under the skin daily at bedtime  , Historical Med      methadone (DOLOPHINE) 10 mg tablet Take 115 mg by mouth daily , , Historical Med      insulin aspart (NovoLOG) 100 units/mL injection Inject under the skin 3 (three) times a day before meals  , Historical Med      ondansetron (ZOFRAN-ODT) 4 mg disintegrating tablet Take 1 tablet by mouth every 4 (four) hours as needed for nausea, Starting Sun 10/15/2017, Normal           No discharge procedures on file      ED Provider  Electronically Signed by       Marissa Cano PA-C  10/20/17 5734

## 2017-10-20 NOTE — ED NOTES
Patient provided ice chips per request  Will continue to monitor     Yesenia Castellon RN  10/20/17 7936

## 2017-10-20 NOTE — ED NOTES
Spoke with Zaina Diaz from respiratory to notify her of blood gas     Lei Barrera, RN  10/20/17 2253

## 2017-10-27 ENCOUNTER — HOSPITAL ENCOUNTER (INPATIENT)
Facility: HOSPITAL | Age: 42
LOS: 6 days | Discharge: HOME | DRG: 249 | End: 2017-11-02
Attending: EMERGENCY MEDICINE | Admitting: INTERNAL MEDICINE
Payer: COMMERCIAL

## 2017-10-27 ENCOUNTER — APPOINTMENT (EMERGENCY)
Dept: CT IMAGING | Facility: HOSPITAL | Age: 42
DRG: 249 | End: 2017-10-27
Payer: COMMERCIAL

## 2017-10-27 DIAGNOSIS — K52.9 ACUTE COLITIS: ICD-10-CM

## 2017-10-27 DIAGNOSIS — E10.65 HYPERGLYCEMIA DUE TO TYPE 1 DIABETES MELLITUS (HCC): Primary | ICD-10-CM

## 2017-10-27 DIAGNOSIS — R11.15 VOMITING, PERSISTENT, IN ADULT: ICD-10-CM

## 2017-10-27 DIAGNOSIS — R11.10 INTRACTABLE VOMITING: ICD-10-CM

## 2017-10-27 DIAGNOSIS — R74.01 TRANSAMINASEMIA: ICD-10-CM

## 2017-10-27 LAB
ALBUMIN SERPL BCP-MCNC: 5.1 G/DL (ref 3.5–5)
ALP SERPL-CCNC: 127 U/L (ref 46–116)
ALT SERPL W P-5'-P-CCNC: 178 U/L (ref 12–78)
AMPHETAMINES SERPL QL SCN: NEGATIVE
ANION GAP BLD CALC-SCNC: 24 MMOL/L (ref 4–13)
ANION GAP SERPL CALCULATED.3IONS-SCNC: 29 MMOL/L (ref 4–13)
APTT PPP: 24 SECONDS (ref 23–35)
AST SERPL W P-5'-P-CCNC: 149 U/L (ref 5–45)
BACTERIA UR QL AUTO: ABNORMAL /HPF
BARBITURATES UR QL: NEGATIVE
BASE EXCESS BLDA CALC-SCNC: -8 MMOL/L (ref -2–3)
BASOPHILS # BLD AUTO: 0.01 THOUSANDS/ΜL (ref 0–0.1)
BASOPHILS NFR BLD AUTO: 0 % (ref 0–1)
BENZODIAZ UR QL: NEGATIVE
BILIRUB SERPL-MCNC: 1.6 MG/DL (ref 0.2–1)
BILIRUB UR QL STRIP: NEGATIVE
BUN BLD-MCNC: 17 MG/DL (ref 5–25)
BUN SERPL-MCNC: 14 MG/DL (ref 5–25)
CA-I BLD-SCNC: 1 MMOL/L (ref 1.12–1.32)
CALCIUM SERPL-MCNC: 9.2 MG/DL (ref 8.3–10.1)
CHLORIDE BLD-SCNC: 95 MMOL/L (ref 100–108)
CHLORIDE SERPL-SCNC: 86 MMOL/L (ref 100–108)
CLARITY UR: CLEAR
CO2 SERPL-SCNC: 15 MMOL/L (ref 21–32)
COCAINE UR QL: NEGATIVE
COLOR UR: YELLOW
CREAT BLD-MCNC: 0.7 MG/DL (ref 0.6–1.3)
CREAT SERPL-MCNC: 0.92 MG/DL (ref 0.6–1.3)
EOSINOPHIL # BLD AUTO: 0.07 THOUSAND/ΜL (ref 0–0.61)
EOSINOPHIL NFR BLD AUTO: 1 % (ref 0–6)
ERYTHROCYTE [DISTWIDTH] IN BLOOD BY AUTOMATED COUNT: 14.3 % (ref 11.6–15.1)
GFR SERPL CREATININE-BSD FRML MDRD: 104 ML/MIN/1.73SQ M
GFR SERPL CREATININE-BSD FRML MDRD: 119 ML/MIN/1.73SQ M
GLUCOSE SERPL-MCNC: 356 MG/DL (ref 65–140)
GLUCOSE SERPL-MCNC: 358 MG/DL (ref 65–140)
GLUCOSE UR STRIP-MCNC: ABNORMAL MG/DL
HCO3 BLDA-SCNC: 16.5 MMOL/L (ref 24–30)
HCT VFR BLD AUTO: 47.2 % (ref 36.5–49.3)
HCT VFR BLD CALC: 53 % (ref 36.5–49.3)
HGB BLD-MCNC: 16.2 G/DL (ref 12–17)
HGB BLDA-MCNC: 18 G/DL (ref 12–17)
HGB UR QL STRIP.AUTO: ABNORMAL
HYALINE CASTS #/AREA URNS LPF: ABNORMAL /LPF
INR PPP: 1.05 (ref 0.86–1.16)
KETONES UR STRIP-MCNC: ABNORMAL MG/DL
LEUKOCYTE ESTERASE UR QL STRIP: NEGATIVE
LIPASE SERPL-CCNC: 68 U/L (ref 73–393)
LYMPHOCYTES # BLD AUTO: 0.84 THOUSANDS/ΜL (ref 0.6–4.47)
LYMPHOCYTES NFR BLD AUTO: 12 % (ref 14–44)
MCH RBC QN AUTO: 31.9 PG (ref 26.8–34.3)
MCHC RBC AUTO-ENTMCNC: 34.3 G/DL (ref 31.4–37.4)
MCV RBC AUTO: 93 FL (ref 82–98)
METHADONE UR QL: POSITIVE
MONOCYTES # BLD AUTO: 0.33 THOUSAND/ΜL (ref 0.17–1.22)
MONOCYTES NFR BLD AUTO: 5 % (ref 4–12)
NEUTROPHILS # BLD AUTO: 5.72 THOUSANDS/ΜL (ref 1.85–7.62)
NEUTS SEG NFR BLD AUTO: 82 % (ref 43–75)
NITRITE UR QL STRIP: NEGATIVE
NON-SQ EPI CELLS URNS QL MICRO: ABNORMAL /HPF
OPIATES UR QL SCN: NEGATIVE
PCO2 BLD: 17 MMOL/L (ref 21–32)
PCO2 BLD: 18 MMOL/L (ref 21–32)
PCO2 BLD: 30.6 MM HG (ref 42–50)
PCP UR QL: NEGATIVE
PH BLD: 7.34 [PH] (ref 7.3–7.4)
PH UR STRIP.AUTO: 5.5 [PH] (ref 4.5–8)
PLATELET # BLD AUTO: 129 THOUSANDS/UL (ref 149–390)
PMV BLD AUTO: 11.1 FL (ref 8.9–12.7)
PO2 BLD: 55 MM HG (ref 35–45)
POTASSIUM BLD-SCNC: 6.2 MMOL/L (ref 3.5–5.3)
POTASSIUM SERPL-SCNC: 5 MMOL/L (ref 3.5–5.3)
PROT SERPL-MCNC: 8.7 G/DL (ref 6.4–8.2)
PROT UR STRIP-MCNC: ABNORMAL MG/DL
PROTHROMBIN TIME: 13.5 SECONDS (ref 12.1–14.4)
RBC # BLD AUTO: 5.08 MILLION/UL (ref 3.88–5.62)
RBC #/AREA URNS AUTO: ABNORMAL /HPF
SAO2 % BLD FROM PO2: 87 % (ref 95–98)
SODIUM BLD-SCNC: 130 MMOL/L (ref 136–145)
SODIUM SERPL-SCNC: 130 MMOL/L (ref 136–145)
SP GR UR STRIP.AUTO: >=1.03 (ref 1–1.03)
SPECIMEN SOURCE: ABNORMAL
SPECIMEN SOURCE: ABNORMAL
THC UR QL: NEGATIVE
UROBILINOGEN UR QL STRIP.AUTO: 0.2 E.U./DL
WBC # BLD AUTO: 6.97 THOUSAND/UL (ref 4.31–10.16)
WBC #/AREA URNS AUTO: ABNORMAL /HPF

## 2017-10-27 PROCEDURE — 83690 ASSAY OF LIPASE: CPT | Performed by: EMERGENCY MEDICINE

## 2017-10-27 PROCEDURE — 85025 COMPLETE CBC W/AUTO DIFF WBC: CPT | Performed by: EMERGENCY MEDICINE

## 2017-10-27 PROCEDURE — 85610 PROTHROMBIN TIME: CPT | Performed by: EMERGENCY MEDICINE

## 2017-10-27 PROCEDURE — 82803 BLOOD GASES ANY COMBINATION: CPT

## 2017-10-27 PROCEDURE — 96375 TX/PRO/DX INJ NEW DRUG ADDON: CPT

## 2017-10-27 PROCEDURE — 85014 HEMATOCRIT: CPT

## 2017-10-27 PROCEDURE — 81001 URINALYSIS AUTO W/SCOPE: CPT | Performed by: EMERGENCY MEDICINE

## 2017-10-27 PROCEDURE — 36415 COLL VENOUS BLD VENIPUNCTURE: CPT | Performed by: EMERGENCY MEDICINE

## 2017-10-27 PROCEDURE — 80047 BASIC METABLC PNL IONIZED CA: CPT

## 2017-10-27 PROCEDURE — 80053 COMPREHEN METABOLIC PANEL: CPT | Performed by: EMERGENCY MEDICINE

## 2017-10-27 PROCEDURE — 85730 THROMBOPLASTIN TIME PARTIAL: CPT | Performed by: EMERGENCY MEDICINE

## 2017-10-27 PROCEDURE — 96372 THER/PROPH/DIAG INJ SC/IM: CPT

## 2017-10-27 PROCEDURE — 74177 CT ABD & PELVIS W/CONTRAST: CPT

## 2017-10-27 PROCEDURE — 93005 ELECTROCARDIOGRAM TRACING: CPT | Performed by: EMERGENCY MEDICINE

## 2017-10-27 PROCEDURE — 80307 DRUG TEST PRSMV CHEM ANLYZR: CPT | Performed by: EMERGENCY MEDICINE

## 2017-10-27 PROCEDURE — 96361 HYDRATE IV INFUSION ADD-ON: CPT

## 2017-10-27 PROCEDURE — 96374 THER/PROPH/DIAG INJ IV PUSH: CPT

## 2017-10-27 RX ORDER — ONDANSETRON 2 MG/ML
4 INJECTION INTRAMUSCULAR; INTRAVENOUS ONCE
Status: COMPLETED | OUTPATIENT
Start: 2017-10-27 | End: 2017-10-27

## 2017-10-27 RX ORDER — METOCLOPRAMIDE HYDROCHLORIDE 5 MG/ML
10 INJECTION INTRAMUSCULAR; INTRAVENOUS ONCE
Status: COMPLETED | OUTPATIENT
Start: 2017-10-27 | End: 2017-10-27

## 2017-10-27 RX ORDER — LORAZEPAM 2 MG/ML
1 INJECTION INTRAMUSCULAR ONCE
Status: COMPLETED | OUTPATIENT
Start: 2017-10-27 | End: 2017-10-27

## 2017-10-27 RX ADMIN — ONDANSETRON 4 MG: 2 INJECTION INTRAMUSCULAR; INTRAVENOUS at 22:33

## 2017-10-27 RX ADMIN — IOHEXOL 90 ML: 350 INJECTION, SOLUTION INTRAVENOUS at 22:46

## 2017-10-27 RX ADMIN — LORAZEPAM 1 MG: 2 INJECTION, SOLUTION INTRAMUSCULAR; INTRAVENOUS at 23:14

## 2017-10-27 RX ADMIN — SODIUM CHLORIDE 1000 ML: 0.9 INJECTION, SOLUTION INTRAVENOUS at 20:58

## 2017-10-27 RX ADMIN — IOHEXOL 50 ML: 240 INJECTION, SOLUTION INTRATHECAL; INTRAVASCULAR; INTRAVENOUS; ORAL at 22:46

## 2017-10-27 RX ADMIN — SODIUM CHLORIDE 1000 ML: 0.9 INJECTION, SOLUTION INTRAVENOUS at 23:15

## 2017-10-27 RX ADMIN — INSULIN DETEMIR 30 UNITS: 100 INJECTION, SOLUTION SUBCUTANEOUS at 22:54

## 2017-10-27 RX ADMIN — METOCLOPRAMIDE 10 MG: 5 INJECTION, SOLUTION INTRAMUSCULAR; INTRAVENOUS at 21:04

## 2017-10-28 PROBLEM — E10.10 DIABETIC KETOACIDOSIS WITHOUT COMA ASSOCIATED WITH TYPE 1 DIABETES MELLITUS (HCC): Status: ACTIVE | Noted: 2017-10-28

## 2017-10-28 PROBLEM — R11.15 VOMITING, PERSISTENT, IN ADULT: Status: ACTIVE | Noted: 2017-10-28

## 2017-10-28 PROBLEM — R73.9 HYPERGLYCEMIA: Status: ACTIVE | Noted: 2017-10-28

## 2017-10-28 PROBLEM — K59.00 CONSTIPATION: Status: ACTIVE | Noted: 2017-10-28

## 2017-10-28 PROBLEM — K52.9 COLITIS: Status: ACTIVE | Noted: 2017-10-28

## 2017-10-28 LAB
ANION GAP SERPL CALCULATED.3IONS-SCNC: 10 MMOL/L (ref 4–13)
ANION GAP SERPL CALCULATED.3IONS-SCNC: 15 MMOL/L (ref 4–13)
ANION GAP SERPL CALCULATED.3IONS-SCNC: 20 MMOL/L (ref 4–13)
ANION GAP SERPL CALCULATED.3IONS-SCNC: 24 MMOL/L (ref 4–13)
BUN SERPL-MCNC: 10 MG/DL (ref 5–25)
BUN SERPL-MCNC: 10 MG/DL (ref 5–25)
BUN SERPL-MCNC: 12 MG/DL (ref 5–25)
BUN SERPL-MCNC: 8 MG/DL (ref 5–25)
CALCIUM SERPL-MCNC: 7.7 MG/DL (ref 8.3–10.1)
CALCIUM SERPL-MCNC: 7.8 MG/DL (ref 8.3–10.1)
CALCIUM SERPL-MCNC: 8 MG/DL (ref 8.3–10.1)
CALCIUM SERPL-MCNC: 8.1 MG/DL (ref 8.3–10.1)
CHLORIDE SERPL-SCNC: 100 MMOL/L (ref 100–108)
CHLORIDE SERPL-SCNC: 94 MMOL/L (ref 100–108)
CHLORIDE SERPL-SCNC: 97 MMOL/L (ref 100–108)
CHLORIDE SERPL-SCNC: 97 MMOL/L (ref 100–108)
CO2 SERPL-SCNC: 15 MMOL/L (ref 21–32)
CO2 SERPL-SCNC: 16 MMOL/L (ref 21–32)
CO2 SERPL-SCNC: 19 MMOL/L (ref 21–32)
CO2 SERPL-SCNC: 24 MMOL/L (ref 21–32)
CREAT SERPL-MCNC: 0.74 MG/DL (ref 0.6–1.3)
CREAT SERPL-MCNC: 0.84 MG/DL (ref 0.6–1.3)
CREAT SERPL-MCNC: 0.93 MG/DL (ref 0.6–1.3)
CREAT SERPL-MCNC: 0.95 MG/DL (ref 0.6–1.3)
EST. AVERAGE GLUCOSE BLD GHB EST-MCNC: 194 MG/DL
GFR SERPL CREATININE-BSD FRML MDRD: 100 ML/MIN/1.73SQ M
GFR SERPL CREATININE-BSD FRML MDRD: 103 ML/MIN/1.73SQ M
GFR SERPL CREATININE-BSD FRML MDRD: 110 ML/MIN/1.73SQ M
GFR SERPL CREATININE-BSD FRML MDRD: 116 ML/MIN/1.73SQ M
GLUCOSE SERPL-MCNC: 161 MG/DL (ref 65–140)
GLUCOSE SERPL-MCNC: 166 MG/DL (ref 65–140)
GLUCOSE SERPL-MCNC: 186 MG/DL (ref 65–140)
GLUCOSE SERPL-MCNC: 195 MG/DL (ref 65–140)
GLUCOSE SERPL-MCNC: 209 MG/DL (ref 65–140)
GLUCOSE SERPL-MCNC: 212 MG/DL (ref 65–140)
GLUCOSE SERPL-MCNC: 222 MG/DL (ref 65–140)
GLUCOSE SERPL-MCNC: 227 MG/DL (ref 65–140)
GLUCOSE SERPL-MCNC: 228 MG/DL (ref 65–140)
GLUCOSE SERPL-MCNC: 241 MG/DL (ref 65–140)
GLUCOSE SERPL-MCNC: 243 MG/DL (ref 65–140)
GLUCOSE SERPL-MCNC: 245 MG/DL (ref 65–140)
GLUCOSE SERPL-MCNC: 250 MG/DL (ref 65–140)
GLUCOSE SERPL-MCNC: 251 MG/DL (ref 65–140)
GLUCOSE SERPL-MCNC: 252 MG/DL (ref 65–140)
GLUCOSE SERPL-MCNC: 274 MG/DL (ref 65–140)
GLUCOSE SERPL-MCNC: 284 MG/DL (ref 65–140)
HBA1C MFR BLD: 8.4 % (ref 4.2–6.3)
MAGNESIUM SERPL-MCNC: 1.7 MG/DL (ref 1.6–2.6)
MAGNESIUM SERPL-MCNC: 1.9 MG/DL (ref 1.6–2.6)
MAGNESIUM SERPL-MCNC: 2 MG/DL (ref 1.6–2.6)
MAGNESIUM SERPL-MCNC: 2 MG/DL (ref 1.6–2.6)
PHOSPHATE SERPL-MCNC: 2.3 MG/DL (ref 2.7–4.5)
POTASSIUM SERPL-SCNC: 3.7 MMOL/L (ref 3.5–5.3)
POTASSIUM SERPL-SCNC: 3.8 MMOL/L (ref 3.5–5.3)
POTASSIUM SERPL-SCNC: 4.2 MMOL/L (ref 3.5–5.3)
POTASSIUM SERPL-SCNC: 4.9 MMOL/L (ref 3.5–5.3)
SODIUM SERPL-SCNC: 131 MMOL/L (ref 136–145)
SODIUM SERPL-SCNC: 133 MMOL/L (ref 136–145)
SODIUM SERPL-SCNC: 133 MMOL/L (ref 136–145)
SODIUM SERPL-SCNC: 134 MMOL/L (ref 136–145)

## 2017-10-28 PROCEDURE — 84100 ASSAY OF PHOSPHORUS: CPT | Performed by: NURSE PRACTITIONER

## 2017-10-28 PROCEDURE — 99285 EMERGENCY DEPT VISIT HI MDM: CPT

## 2017-10-28 PROCEDURE — 36415 COLL VENOUS BLD VENIPUNCTURE: CPT | Performed by: EMERGENCY MEDICINE

## 2017-10-28 PROCEDURE — 83036 HEMOGLOBIN GLYCOSYLATED A1C: CPT | Performed by: NURSE PRACTITIONER

## 2017-10-28 PROCEDURE — 83735 ASSAY OF MAGNESIUM: CPT | Performed by: NURSE PRACTITIONER

## 2017-10-28 PROCEDURE — 82948 REAGENT STRIP/BLOOD GLUCOSE: CPT

## 2017-10-28 PROCEDURE — 80048 BASIC METABOLIC PNL TOTAL CA: CPT | Performed by: NURSE PRACTITIONER

## 2017-10-28 PROCEDURE — 80048 BASIC METABOLIC PNL TOTAL CA: CPT | Performed by: EMERGENCY MEDICINE

## 2017-10-28 PROCEDURE — C9113 INJ PANTOPRAZOLE SODIUM, VIA: HCPCS | Performed by: INTERNAL MEDICINE

## 2017-10-28 RX ORDER — CIPROFLOXACIN 2 MG/ML
400 INJECTION, SOLUTION INTRAVENOUS EVERY 12 HOURS
Status: DISCONTINUED | OUTPATIENT
Start: 2017-10-28 | End: 2017-10-28

## 2017-10-28 RX ORDER — POLYETHYLENE GLYCOL 3350 17 G/17G
17 POWDER, FOR SOLUTION ORAL
Status: DISCONTINUED | OUTPATIENT
Start: 2017-10-28 | End: 2017-11-02 | Stop reason: HOSPADM

## 2017-10-28 RX ORDER — PROPRANOLOL HYDROCHLORIDE 20 MG/1
20 TABLET ORAL EVERY 4 HOURS PRN
Status: DISCONTINUED | OUTPATIENT
Start: 2017-10-28 | End: 2017-11-02 | Stop reason: HOSPADM

## 2017-10-28 RX ORDER — PANTOPRAZOLE SODIUM 40 MG/1
40 INJECTION, POWDER, FOR SOLUTION INTRAVENOUS
Status: DISCONTINUED | OUTPATIENT
Start: 2017-10-28 | End: 2017-11-02 | Stop reason: HOSPADM

## 2017-10-28 RX ORDER — ONDANSETRON 2 MG/ML
4 INJECTION INTRAMUSCULAR; INTRAVENOUS EVERY 4 HOURS PRN
Status: DISCONTINUED | OUTPATIENT
Start: 2017-10-28 | End: 2017-11-02 | Stop reason: HOSPADM

## 2017-10-28 RX ORDER — SENNOSIDES 8.6 MG
2 TABLET ORAL 2 TIMES DAILY
Status: DISCONTINUED | OUTPATIENT
Start: 2017-10-28 | End: 2017-11-02 | Stop reason: HOSPADM

## 2017-10-28 RX ORDER — METHADONE HYDROCHLORIDE 10 MG/1
115 TABLET ORAL DAILY
Status: DISCONTINUED | OUTPATIENT
Start: 2017-10-28 | End: 2017-11-02 | Stop reason: HOSPADM

## 2017-10-28 RX ORDER — DULOXETIN HYDROCHLORIDE 30 MG/1
30 CAPSULE, DELAYED RELEASE ORAL DAILY
Status: DISCONTINUED | OUTPATIENT
Start: 2017-10-28 | End: 2017-11-02 | Stop reason: HOSPADM

## 2017-10-28 RX ORDER — SODIUM CHLORIDE 450 MG/100ML
1000 INJECTION, SOLUTION INTRAVENOUS CONTINUOUS
Status: DISCONTINUED | OUTPATIENT
Start: 2017-10-28 | End: 2017-10-28

## 2017-10-28 RX ORDER — DEXTROSE AND SODIUM CHLORIDE 5; .9 G/100ML; G/100ML
250 INJECTION, SOLUTION INTRAVENOUS CONTINUOUS
Status: DISCONTINUED | OUTPATIENT
Start: 2017-10-28 | End: 2017-10-28

## 2017-10-28 RX ORDER — SODIUM CHLORIDE 450 MG/100ML
250 INJECTION, SOLUTION INTRAVENOUS CONTINUOUS
Status: DISCONTINUED | OUTPATIENT
Start: 2017-10-28 | End: 2017-10-28

## 2017-10-28 RX ORDER — SODIUM CHLORIDE 450 MG/100ML
500 INJECTION, SOLUTION INTRAVENOUS CONTINUOUS
Status: DISCONTINUED | OUTPATIENT
Start: 2017-10-28 | End: 2017-10-28

## 2017-10-28 RX ORDER — NICOTINE 21 MG/24HR
14 PATCH, TRANSDERMAL 24 HOURS TRANSDERMAL DAILY
Status: DISCONTINUED | OUTPATIENT
Start: 2017-10-28 | End: 2017-11-02 | Stop reason: HOSPADM

## 2017-10-28 RX ORDER — SALINE 7; 19 G/118ML; G/118ML
1 ENEMA RECTAL ONCE
Status: COMPLETED | OUTPATIENT
Start: 2017-10-28 | End: 2017-10-28

## 2017-10-28 RX ORDER — ALPRAZOLAM 0.25 MG/1
0.25 TABLET ORAL 2 TIMES DAILY PRN
Status: DISCONTINUED | OUTPATIENT
Start: 2017-10-28 | End: 2017-11-02 | Stop reason: HOSPADM

## 2017-10-28 RX ORDER — DOCUSATE SODIUM 100 MG/1
100 CAPSULE, LIQUID FILLED ORAL 2 TIMES DAILY
Status: DISCONTINUED | OUTPATIENT
Start: 2017-10-28 | End: 2017-11-02 | Stop reason: HOSPADM

## 2017-10-28 RX ADMIN — POLYETHYLENE GLYCOL 3350 17 G: 17 POWDER, FOR SOLUTION ORAL at 16:36

## 2017-10-28 RX ADMIN — INSULIN LISPRO 6 UNITS: 100 INJECTION, SOLUTION INTRAVENOUS; SUBCUTANEOUS at 22:45

## 2017-10-28 RX ADMIN — POLYETHYLENE GLYCOL 3350 17 G: 17 POWDER, FOR SOLUTION ORAL at 08:54

## 2017-10-28 RX ADMIN — SENNOSIDES 17.2 MG: 8.6 TABLET, FILM COATED ORAL at 17:12

## 2017-10-28 RX ADMIN — DEXTROSE AND SODIUM CHLORIDE 250 ML/HR: 5; 900 INJECTION, SOLUTION INTRAVENOUS at 06:51

## 2017-10-28 RX ADMIN — PROPRANOLOL HYDROCHLORIDE 20 MG: 20 TABLET ORAL at 20:34

## 2017-10-28 RX ADMIN — CIPROFLOXACIN 400 MG: 2 INJECTION, SOLUTION INTRAVENOUS at 03:43

## 2017-10-28 RX ADMIN — PANTOPRAZOLE SODIUM 40 MG: 40 INJECTION, POWDER, FOR SOLUTION INTRAVENOUS at 08:55

## 2017-10-28 RX ADMIN — METHADONE HYDROCHLORIDE 115 MG: 10 TABLET ORAL at 08:55

## 2017-10-28 RX ADMIN — ONDANSETRON 4 MG: 2 INJECTION INTRAMUSCULAR; INTRAVENOUS at 15:01

## 2017-10-28 RX ADMIN — SODIUM PHOSPHATE 1 ENEMA: 7; 19 ENEMA RECTAL at 06:46

## 2017-10-28 RX ADMIN — ALPRAZOLAM 0.25 MG: 0.25 TABLET ORAL at 22:44

## 2017-10-28 RX ADMIN — SODIUM CHLORIDE 2 UNITS/HR: 9 INJECTION, SOLUTION INTRAVENOUS at 02:42

## 2017-10-28 RX ADMIN — DULOXETINE 30 MG: 30 CAPSULE, DELAYED RELEASE ORAL at 20:34

## 2017-10-28 RX ADMIN — ONDANSETRON 4 MG: 2 INJECTION INTRAMUSCULAR; INTRAVENOUS at 06:31

## 2017-10-28 RX ADMIN — DOCUSATE SODIUM 100 MG: 100 CAPSULE, LIQUID FILLED ORAL at 17:12

## 2017-10-28 RX ADMIN — SENNOSIDES 17.2 MG: 8.6 TABLET, FILM COATED ORAL at 08:55

## 2017-10-28 RX ADMIN — INSULIN DETEMIR 30 UNITS: 100 INJECTION, SOLUTION SUBCUTANEOUS at 22:46

## 2017-10-28 RX ADMIN — DOCUSATE SODIUM 100 MG: 100 CAPSULE, LIQUID FILLED ORAL at 08:55

## 2017-10-28 RX ADMIN — METRONIDAZOLE 500 MG: 500 INJECTION, SOLUTION INTRAVENOUS at 03:43

## 2017-10-28 RX ADMIN — DEXTROSE AND SODIUM CHLORIDE 250 ML/HR: 5; 900 INJECTION, SOLUTION INTRAVENOUS at 02:41

## 2017-10-28 RX ADMIN — INSULIN LISPRO 2 UNITS: 100 INJECTION, SOLUTION INTRAVENOUS; SUBCUTANEOUS at 16:38

## 2017-10-28 RX ADMIN — NICOTINE 14 MG: 14 PATCH, EXTENDED RELEASE TRANSDERMAL at 17:48

## 2017-10-28 NOTE — ED PROVIDER NOTES
History  Chief Complaint   Patient presents with    Vomiting     Presents with C/O vomiting x 3 weeks, no bowel movement x 2 weeks  Ran out of insulin yesterday  This 30-year-old man with history of diabetes type I and narcotic dependence states he has not had a bowel movement in almost 3 days  He also complains that he has been vomiting frequently over the last  2 weeks  He ran out of his Levemir yesterday  His blood sugar is elevated  He denies fever, pain, syncope, bloody stools and dyspnea  He feels somewhat weak, anxious and lightheaded at times  Prior to Admission Medications   Prescriptions Last Dose Informant Patient Reported? Taking?   insulin aspart (NovoLOG) 100 units/mL injection 10/26/2017 at Unknown time  Yes Yes   Sig: Inject under the skin 3 (three) times a day before meals     insulin detemir (LEVEMIR) 100 units/mL subcutaneous injection 10/26/2017 at Unknown time  Yes Yes   Sig: Inject 30 Units under the skin daily at bedtime     methadone (DOLOPHINE) 10 mg tablet 10/27/2017 at Unknown time  Yes Yes   Sig: Take 115 mg by mouth daily ,    ondansetron (ZOFRAN) 4 mg tablet Past Month at Unknown time  No Yes   Sig: Take 1 tablet by mouth every 8 (eight) hours as needed for nausea or vomiting for up to 7 days      Facility-Administered Medications: None       Past Medical History:   Diagnosis Date    Diabetes mellitus ([facility] [address] )     Narcotic abuse        History reviewed  No pertinent surgical history  Family History   Problem Relation Age of Onset    Family history unknown: Yes     I have reviewed and agree with the history as documented  Social History   Substance Use Topics    Smoking status: Current Every Day Smoker     Packs/day: 0 50     Types: Cigarettes    Smokeless tobacco: Never Used      Comment: 4-5 cigarettes/day    Alcohol use Yes      Comment: Socially        Review of Systems   Constitutional: Positive for activity change, appetite change and fatigue  Negative for chills, diaphoresis and fever  HENT: Negative  Eyes: Negative  Respiratory: Negative  Cardiovascular: Negative  Gastrointestinal: Positive for constipation, nausea and vomiting  Negative for abdominal distention, abdominal pain, blood in stool and rectal pain  Endocrine: Negative  Genitourinary: Negative  Musculoskeletal: Negative  Skin: Negative  Allergic/Immunologic: Negative  Neurological: Negative  Hematological: Negative  Psychiatric/Behavioral: Negative  All other systems reviewed and are negative  Physical Exam  ED Triage Vitals   Temperature Pulse Respirations Blood Pressure SpO2   10/27/17 2016 10/27/17 2015 10/27/17 2016 10/27/17 2016 10/27/17 2015   (!) 96 7 °F (35 9 °C) (!) 117 20 149/89 98 %      Temp Source Heart Rate Source Patient Position - Orthostatic VS BP Location FiO2 (%)   10/27/17 2016 10/27/17 2016 10/27/17 2016 10/27/17 2016 --   Temporal Monitor Lying Right arm       Pain Score       10/27/17 2018       No Pain           Orthostatic Vital Signs  Vitals:    10/27/17 2230 10/27/17 2308 10/27/17 2315 10/27/17 2330   BP: 142/92 139/83     Pulse: 83 89 88 83   Patient Position - Orthostatic VS: Lying Lying         Physical Exam   Constitutional: He is oriented to person, place, and time  He appears well-developed and well-nourished  No distress  HENT:   Head: Normocephalic and atraumatic  Right Ear: External ear normal    Left Ear: External ear normal    Mouth/Throat: Oropharynx is clear and moist    Dry oral mucosa   Eyes: Conjunctivae and EOM are normal  Pupils are equal, round, and reactive to light  Neck: Normal range of motion  Neck supple  No JVD present  Cardiovascular: Normal rate, regular rhythm, normal heart sounds and intact distal pulses  No murmur heard  Pulmonary/Chest: Effort normal and breath sounds normal    Abdominal: Soft  Bowel sounds are normal  He exhibits no mass   Tenderness:  right upper quadrant tenderness with hepatomegaly  There is no rebound and no guarding  Genitourinary: Rectum normal and prostate normal  Rectal exam shows guaiac negative stool  Musculoskeletal: Normal range of motion  He exhibits no edema or tenderness  Lymphadenopathy:     He has no cervical adenopathy  Neurological: He is alert and oriented to person, place, and time  He has normal reflexes  No cranial nerve deficit  Coordination normal    Skin: Skin is warm and dry  No rash noted  He is not diaphoretic  Psychiatric: He has a normal mood and affect  His behavior is normal    Nursing note and vitals reviewed        ED Medications  Medications   sodium chloride 0 9 % bolus 1,000 mL (1,000 mL Intravenous New Bag 10/27/17 2315)   sodium chloride 0 9 % bolus 1,000 mL (0 mL Intravenous Stopped 10/27/17 2140)   metoclopramide (REGLAN) injection 10 mg (10 mg Intravenous Given 10/27/17 2104)   ondansetron (ZOFRAN) injection 4 mg (4 mg Intravenous Given 10/27/17 2233)   insulin detemir (LEVEMIR) subcutaneous injection 30 Units (30 Units Subcutaneous Given 10/27/17 2254)   iohexol (OMNIPAQUE) 350 MG/ML injection (MULTI-DOSE) 90 mL (90 mL Intravenous Given 10/27/17 2246)   iohexol (OMNIPAQUE) 240 MG/ML solution 50 mL (50 mL Oral Given 10/27/17 2246)   LORazepam (ATIVAN) 2 mg/mL injection 1 mg (1 mg Intravenous Given 10/27/17 2314)       Diagnostic Studies  Results Reviewed     Procedure Component Value Units Date/Time    Urine Microscopic [[id number]]  (Abnormal) Collected:  10/27/17 2202    Lab Status:  Final result Specimen:  Urine from Urine, Clean Catch Updated:  10/27/17 2221     RBC, UA 2-4 (A) /hpf      WBC, UA 0-1 (A) /hpf      Epithelial Cells Occasional /hpf      Bacteria, UA None Seen /hpf      Hyaline Casts, UA 0-1 (A) /lpf     Rapid drug screen, urine [[id number]]  (Abnormal) Collected:  10/27/17 2202    Lab Status:  Final result Specimen:  Urine from Urine, Clean Catch Updated:  10/27/17 2219     Amph/Meth UR Negative Barbiturate Ur Negative     Benzodiazepine Urine Negative     Cocaine Urine Negative     Methadone Urine Positive (A)     Opiate Urine Negative     PCP Ur Negative     THC Urine Negative    Narrative:         Presumptive report  If requested, specimen will be sent to reference lab for confirmation  FOR MEDICAL PURPOSES ONLY  IF CONFIRMATION NEEDED PLEASE CONTACT THE LAB WITHIN 5 DAYS      Drug Screen Cutoff Levels:  AMPHETAMINE/METHAMPHETAMINES  1000 ng/mL  BARBITURATES     200 ng/mL  BENZODIAZEPINES     200 ng/mL  COCAINE      300 ng/mL  METHADONE      300 ng/mL  OPIATES      300 ng/mL  PHENCYCLIDINE     25 ng/mL  THC       50 ng/mL    UA w Reflex to Microscopic [[id number]]  (Abnormal) Collected:  10/27/17 2202    Lab Status:  Final result Specimen:  Urine from Urine, Clean Catch Updated:  10/27/17 2209     Color, UA Yellow     Clarity, UA Clear     Specific Gravity, UA >=1 030     pH, UA 5 5     Leukocytes, UA Negative     Nitrite, UA Negative     Protein, UA Trace (A) mg/dl      Glucose,  (1/2%) (A) mg/dl      Ketones, UA >=80 (3+) (A) mg/dl      Urobilinogen, UA 0 2 E U /dl      Bilirubin, UA Negative     Blood, UA Trace-lysed (A)    POCT Blood Gas (G3+) [[id number]]  (Abnormal) Collected:  10/27/17 2130    Lab Status:  Final result Updated:  10/27/17 2139     ph, Russ ISTAT 7 341     pCO2, Russ i-STAT 30 6 (L) mm HG      pO2, Russ i-STAT 55 0 (H) mm HG      BE, i-STAT -8 (L) mmol/L      HCO3, Russ i-STAT 16 5 (LL) mmol/L      CO2, i-STAT 17 (L) mmol/L      O2 Sat, i-STAT 87 (L) %      Specimen Type VENOUS    Comprehensive metabolic panel [[id number]]  (Abnormal) Collected:  10/27/17 2035    Lab Status:  Final result Specimen:  Blood from Arm, Left Updated:  10/27/17 2106     Sodium 130 (L) mmol/L      Potassium 5 0 mmol/L      Chloride 86 (L) mmol/L      CO2 15 (L) mmol/L      Anion Gap 29 (H) mmol/L      BUN 14 mg/dL      Creatinine 0 92 mg/dL      Glucose 356 (H) mg/dL      Calcium 9 2 mg/dL       (H) U/L       (H) U/L      Alkaline Phosphatase 127 (H) U/L      Total Protein 8 7 (H) g/dL      Albumin 5 1 (H) g/dL      Total Bilirubin 1 60 (H) mg/dL      eGFR 104 ml/min/1 73sq m     Narrative:         National Kidney Disease Education Program recommendations are as follows:  GFR calculation is accurate only with a steady state creatinine  Chronic Kidney disease less than 60 ml/min/1 73 sq  meters  Kidney failure less than 15 ml/min/1 73 sq  meters  Lipase [[id number]]  (Abnormal) Collected:  10/27/17 2035    Lab Status:  Final result Specimen:  Blood from Arm, Left Updated:  10/27/17 2106     Lipase 68 (L) u/L     Protime-INR [[id number]]  (Normal) Collected:  10/27/17 2035    Lab Status:  Final result Specimen:  Blood from Arm, Left Updated:  10/27/17 2101     Protime 13 5 seconds      INR 1 05    APTT [[id number]]  (Normal) Collected:  10/27/17 2035    Lab Status:  Final result Specimen:  Blood from Arm, Left Updated:  10/27/17 2101     PTT 24 seconds     Narrative:          Therapeutic Heparin Range = 60-90 seconds    POCT Chem 8+ [[id number]]  (Abnormal) Collected:  10/27/17 2048    Lab Status:  Final result Updated:  10/27/17 2052     SODIUM, I-STAT 130 (L) mmol/l      Potassium, i-STAT 6 2 (H) mmol/L      Chloride, istat 95 (L) mmol/L      CO2, i-STAT 18 (L) mmol/L      Anion Gap, Istat 24 (H) mmol/L      Calcium, Ionized i-STAT 1 00 (L) mmol/L      BUN, I-STAT 17 mg/dl      Creatinine, i-STAT 0 7 mg/dl      eGFR 119 ml/min/1 73sq m      Glucose, i-STAT 358 (H) mg/dl      Hct, i-STAT 53 (H) %      Hgb, i-STAT 18 0 (H) g/dl      Specimen Type VENOUS    CBC and differential [[id number]]  (Abnormal) Collected:  10/27/17 2035    Lab Status:  Final result Specimen:  Blood from Arm, Left Updated:  10/27/17 2050     WBC 6 97 Thousand/uL      RBC 5 08 Million/uL      Hemoglobin 16 2 g/dL      Hematocrit 47 2 %      MCV 93 fL      MCH 31 9 pg      MCHC 34 3 g/dL      RDW 14 3 %      MPV 11 1 fL      Platelets 041 (L) Thousands/uL      Neutrophils Relative 82 (H) %      Lymphocytes Relative 12 (L) %      Monocytes Relative 5 %      Eosinophils Relative 1 %      Basophils Relative 0 %      Neutrophils Absolute 5 72 Thousands/µL      Lymphocytes Absolute 0 84 Thousands/µL      Monocytes Absolute 0 33 Thousand/µL      Eosinophils Absolute 0 07 Thousand/µL      Basophils Absolute 0 01 Thousands/µL                  CT abdomen pelvis with contrast   ED Interpretation by Alda Angelucci, DO (10/27 4499)   Per [facility], Severe hepatic steatosis  Hepatomegaly  Mild colitis of ascending colon                   Procedures  ECG 12 Lead Documentation  Date/Time: 10/27/2017 9:18 PM  Performed by: Jenae Harper by: Cecil Glaser     ECG reviewed by me, the ED Provider: yes    Patient location:  ED  Previous ECG:     Previous ECG:  Unavailable  Interpretation:     Interpretation: normal      Interpretation comment:  Possible left ventricular hypertrophy           Phone Contacts  ED Phone Contact    ED Course  ED Course                                MDM  Number of Diagnoses or Management Options  Acute colitis: new and requires workup  Hyperglycemia due to type 1 diabetes mellitus ([address] ): established and worsening  Intractable vomiting: established and worsening  Transaminasemia: new and requires workup     Amount and/or Complexity of Data Reviewed  Clinical lab tests: ordered and reviewed  Tests in the radiology section of CPT®: ordered and reviewed  Review and summarize past medical records: yes  Discuss the patient with other providers: yes  Independent visualization of images, tracings, or specimens: yes      CritCare Time    Disposition  Final diagnoses:   Hyperglycemia due to type 1 diabetes mellitus ([address] )   Transaminasemia   Acute colitis   Intractable vomiting     Time reflects when diagnosis was documented in both MDM as applicable and the Disposition within this note     Time User Action Codes Description Comment 10/27/2017 11:45 PM Christine ANDERSON Add [E10 65] Hyperglycemia due to type 1 diabetes mellitus ([facility] [address] )     10/27/2017 11:46 PM Carolann Kailey Add [R74 0] Transaminasemia     10/27/2017 11:46 PM Carolann Bonner Add [K52 9] Acute colitis     10/27/2017 11:46 PM Carolann Bonner Add [R11 10] Intractable vomiting       ED Disposition     ED Disposition Condition Comment    Admit  Case was discussed with NP and the patient's admission status was agreed to be Admission Status: inpatient status to the service of Dr Roselyn Ross   Follow-up Information    None       Patient's Medications   Discharge Prescriptions    No medications on file     No discharge procedures on file      ED Provider  Electronically Signed by           Dhruv Lynn,   10/27/17 Galen 1429, DO  10/28/17 6260

## 2017-10-28 NOTE — ED NOTES
Pt with small duffle bag, bag of meds (to be stored by nursing/pharmacy), pt verb understanding  Pt denies having any firearms in possession       Sean Nunez RN  10/28/17 5976

## 2017-10-28 NOTE — ED NOTES
Patient transported to [facility], [address] [place] [address]  10/27/17 7370

## 2017-10-28 NOTE — H&P
H&P Exam - Kylie Camera 46 y o  male MRN: [id number]    Unit/Bed#: [facility] 218-01 Encounter: [id number]      Assessment:  Principal Problem:    Diabetic ketoacidosis without coma associated with type 1 diabetes mellitus ([address] )  Active Problems:    Colitis    Type 1 diabetes mellitus (HCC)    Vomiting, persistent, in adult    Hyperglycemia    Constipation  Resolved Problems:    * No resolved hospital problems  *      Plan:  1  Diabetic ketoacidosis from type 1 diabetes secondary to colitis infection  Patient's blood glucose is actually relatively controlled currently at 0222 hours however his anion gap on his last BMP was 24  Will start IV fluids with D5 normal saline and an insulin drip at 2 units an hour with hourly blood glucose checks titrate to keep blood glucose 150-200      2  Colitis with constipation  Patient states he has not had a bowel movement in 2 weeks  He tried some over-the-counter stool softeners without relief  Will give fleets enema, soapsuds enema if needed  MiraLax b i d , Colace b i d , senna b i d   Patient is on methadone which is clearly not helping the constipation however his CT scan shows severe hepatics steatosis, hepatomegaly measuring 21 cm,   Abundant fecal material in the transverse descending and sigmoid colon without bowel obstruction, Under distention versus colitis of the ascending colon with submucosal bowel wall thickening /edema, likely infectious or inflammatory colitis  Will consult GI     3  Persistent vomiting: Likely secondary to diabetic ketoacidosis with anion gap  Will medicate with IV fluids and Zofran p r n  Start diabetic clear liquids as tolerated    4  History of narcotic abuse  Pt is on methadone  Gets it at the [facility] in [facility]  They are open from 7-9:30  Given his dose of methadone 115 mg tonight  He has not been able to keep it down and I suspect some withdrawal  is contributing to his nausea         History of Present Dalia Brito  Is a 19-year-old male who presented to the hospital with a chief complaint of persistent vomiting for 2 weeks with constipation and hyperglycemia  He is a type 1 diabetic that takes Levemir and Humalog insulin  He ran out of his Levemir yesterday and states his blood sugar has been elevated in the 2-3 0s  He has been vomiting intermittently but at least every day for the last 2 weeks  He states he has been very constipated for 2 weeks but he states that he has not had a bowel movement in over 3 days  Review of Systems   Gastrointestinal: Positive for abdominal pain, constipation, nausea and vomiting  Historical Information   Past Medical History:   Diagnosis Date    Diabetes mellitus ([facility] [address] )     Narcotic abuse      History reviewed  No pertinent surgical history    Social History   History   Alcohol Use    Yes     Comment: Socially     History   Drug Use No     History   Smoking Status    Current Every Day Smoker    Packs/day: 0 20    Years: 10 00    Types: Cigarettes   Smokeless Tobacco    Never Used     Comment: 4-5 cigarettes/day     Family History   Problem Relation Age of Onset    Family history unknown: Yes       Meds/Allergies   Meds and Allergies Reviewed     No Known Allergies    Scheduled Meds    ciprofloxacin 400 mg Intravenous Q12H   docusate sodium 100 mg Oral BID   metroNIDAZOLE 500 mg Intravenous Q8H   polyethylene glycol 17 g Oral BID (diuretic)   senna 2 tablet Oral BID   sodium phosphate-biphosphate 1 enema Rectal Once     Continuous Infusions    dextrose 5 % and sodium chloride 0 9 % 250 mL/hr Last Rate: 250 mL/hr (10/28/17 0241)   insulin regular (HumuLIN R,NovoLIN R) infusion 0 1-30 Units/hr Last Rate: 2 Units/hr (10/28/17 0242)     PRN Meds    Prescriptions Prior to Admission   Medication    insulin aspart (NovoLOG) 100 units/mL injection    insulin detemir (LEVEMIR) 100 units/mL subcutaneous injection    methadone (DOLOPHINE) 10 MG/5ML solution  ondansetron (ZOFRAN) 4 mg tablet       Objective   First Vitals:   Blood Pressure: 149/89 (10/27/17 2016)  Pulse: (!) 117 (10/27/17 2015)  Temperature: (!) 96 7 °F (35 9 °C) (10/27/17 2016)  Temp Source: Temporal (10/27/17 2016)  Respirations: 20 (10/27/17 2016)  Height: 6' 4" (193 cm) (10/27/17 2018)  Weight - Scale: 86 2 kg (190 lb) (10/27/17 2018)  SpO2: 98 % (10/27/17 2015)    Current Vitals:   Blood Pressure: 159/95 (10/28/17 0118)  Pulse: (!) 113 (10/28/17 0118)  Temperature: 98 3 °F (36 8 °C) (10/28/17 0118)  Temp Source: Tympanic (10/28/17 0118)  Respirations: 20 (10/28/17 0118)  Height: 6' 4" (193 cm) (10/28/17 0118)  Weight - Scale: 88 5 kg (195 lb 1 7 oz) (10/28/17 0118)  SpO2: 98 % (10/28/17 0118)    Intake/Output Summary (Last 24 hours) at 10/28/17 0322  Last data filed at 10/27/17 2140   Gross per 24 hour   Intake             1000 ml   Output                0 ml   Net             1000 ml     Invasive Devices     Peripheral Intravenous Line            Peripheral IV 10/27/17 Right Antecubital less than 1 day              Physical Exam   Constitutional: He is oriented to person, place, and time  He appears well-developed  HENT:   Head: Normocephalic and atraumatic  Eyes: Conjunctivae and EOM are normal  Pupils are equal, round, and reactive to light  Neck: Normal range of motion  Neck supple  No JVD present  No tracheal deviation present  No thyromegaly present  Cardiovascular: Normal rate, regular rhythm, normal heart sounds and intact distal pulses  Pulmonary/Chest: Effort normal and breath sounds normal  No stridor  No respiratory distress  He exhibits no tenderness  Abdominal: Soft  Bowel sounds are normal  He exhibits no distension  There is hepatomegaly  There is generalized tenderness  Genitourinary: Penis normal    Musculoskeletal: Normal range of motion  He exhibits no edema, tenderness or deformity  Lymphadenopathy:     He has no cervical adenopathy     Neurological: He is alert and oriented to person, place, and time  He has normal reflexes  Skin: Skin is warm and dry  Psychiatric: He has a normal mood and affect  His behavior is normal    Nursing note and vitals reviewed  Lab Results: I have reviewed all films and/or reports available    Results from last 7 days  Lab Units 10/27/17  2048 10/27/17  2035   WBC Thousand/uL  --  6 97   HEMOGLOBIN g/dL  --  16 2   I STAT HEMOGLOBIN g/dl 18 0*  --    HEMATOCRIT %  --  47 2   PLATELETS Thousands/uL  --  129*   NEUTROS PCT %  --  82*   LYMPHS PCT %  --  12*   MONOS PCT %  --  5   EOS PCT %  --  1       Results from last 7 days  Lab Units 10/28/17  0024  10/27/17  2035   SODIUM mmol/L 133*  --  130*   POTASSIUM mmol/L 4 9  --  5 0   CHLORIDE mmol/L 94*  --  86*   CO2 mmol/L 15*  --  15*   BUN mg/dL 12  --  14   CREATININE mg/dL 0 95  --  0 92   CALCIUM mg/dL 8 1*  --  9 2   TOTAL PROTEIN g/dL  --   --  8 7*   BILIRUBIN TOTAL mg/dL  --   --  1 60*   ALK PHOS U/L  --   --  127*   ALT U/L  --   --  178*   AST U/L  --   --  149*   GLUCOSE RANDOM mg/dL 284*  --  356*   GLUCOSE, ISTAT   --   < >  --    < > = values in this interval not displayed    No results found for: Donato Casas      Results from last 7 days  Lab Units 10/27/17  2035   INR  1 05     Urinalysis:   Lab Results   Component Value Date    COLORU Yellow 10/27/2017    CLARITYU Clear 10/27/2017    SPECGRAV >=1 030 10/27/2017    PHUR 5 5 10/27/2017    LEUKOCYTESUR Negative 10/27/2017    NITRITE Negative 10/27/2017    PROTEINUA Trace (A) 10/27/2017    GLUCOSEU 500 (1/2%) (A) 10/27/2017    KETONESU >=80 (3+) (A) 10/27/2017    BILIRUBINUR Negative 10/27/2017    BLOODU Trace-lysed (A) 10/27/2017     No results found for: Giselle Sweet, Carla Bennett, SPUTUMCULTUR      Imaging: I have reviewed all films and/or reports available  CT abd pelvis read by [facility]   severe hepatic steatosis, hepatomegaly measuring approximately 21 cm in craniocaudad dimension, findings possibly representing under distention versus mild colitis of the ascending colon was submucosal bowel wall thickening / edema  Differential considerations include infectious, inflammatory (Crohn's disease or ulcerative colitis) or less likely ischemic etiologies        Code Status: Prior  Advance Directive and Living Will:        Based on this patients condition, I anticipate a greater than 2 midnight stay  This patient is admitted to inpatient status       ALESSIO Mcgowan  10/28/2017,3:22 AM

## 2017-10-28 NOTE — CASE MANAGEMENT
[address] [place] [address] in the [facility] by Ousmane Valles for 2017  Network Utilization Review Department  Phone: [phone]; Fax [phone]  ATTENTION: The Network Utilization Review Department is now centralized for our 7 Facilities  Make a note that we have a new phone and fax numbers for our Department  Please call with any questions or concerns to [phone] and carefully follow the prompts so that you are directed to the right person  All voicemails are confidential  Fax any determinations, approvals, denials, and requests for initial or continue stay review clinical to [phone]  Due to HIGH CALL volume, it would be easier if you could please send faxed requests to expedite your requests and in part, help us provide discharge notifications faster     ===================================================================================    Initial Clinical Review    Admission: Date/Time/Statement: 10/27/17 @ 2347   Orders Placed This Encounter   Procedures    Inpatient Admission (expected length of stay for this patient is greater than two midnights)     Standing Status:   Standing     Number of Occurrences:   1     Order Specific Question:   Admitting Physician     Answer:   Danny Doss [[id number]]     Order Specific Question:   Level of Care     Answer:   Med Surg [[id number]]     Order Specific Question:   Estimated length of stay     Answer:   More than 2 Midnights     Order Specific Question:   Certification     Answer:   I certify that inpatient services are medically necessary for this patient for a duration of greater than two midnights  See H&P and MD Progress Notes for additional information about the patient's course of treatment       ED: Date/Time/Mode of Arrival:   ED Arrival Information     Expected Arrival Acuity Means of Arrival Escorted By Service Admission Type    - 10/27/2017 20:03 Urgent Walk-In Self General Medicine Urgent    Arrival Complaint vomiting, constipation, sniffels        Chief Complaint:   Chief Complaint   Patient presents with    Vomiting     Presents with C/O vomiting x 3 weeks, no bowel movement x 2 weeks  Ran out of insulin yesterday  History of Illness:   Liz Lara  Is a 79-year-old male who presented to the hospital with a chief complaint of persistent vomiting for 2 weeks with constipation and hyperglycemia  He is a type 1 diabetic that takes Levemir and Humalog insulin  He ran out of his Levemir yesterday and states his blood sugar has been elevated in the 2-3 0s  He has been vomiting intermittently but at least every day for the last 2 weeks  He states he has been very constipated for 2 weeks but he states that he has not had a bowel movement in over 3 days      ED Vital Signs:   ED Triage Vitals   Temperature Pulse Respirations Blood Pressure SpO2   10/27/17 2016 10/27/17 2015 10/27/17 2016 10/27/17 2016 10/27/17 2015   (!) 96 7 °F (35 9 °C) (!) 117 20 149/89 98 %      Temp Source Heart Rate Source Patient Position - Orthostatic VS BP Location FiO2 (%)   10/27/17 2016 10/27/17 2016 10/27/17 2016 10/27/17 2016 --   Temporal Monitor Lying Right arm       Pain Score       10/27/17 2018       No Pain        Wt Readings from Last 1 Encounters:   10/28/17 88 5 kg (195 lb 1 7 oz)     Abnormal Labs/Diagnostic Test Results:   CT abd/pel:  No acute intra-abdominal abnormality  Marked hepatic steatosis  Lipase 68 (L) 73 - 393 u/L     Sodium 130 (L) 136 - 145 mmol/L      Potassium 5 0 3 5 - 5 3 mmol/L     Comment: 18Slightly Hemolyzed;  Results May be Affected       Chloride 86 (L) 100 - 108 mmol/L     CO2 15 (L) 21 - 32 mmol/L     Anion Gap 29 (H) 4 - 13 mmol/L     BUN 14 5 - 25 mg/dL     Creatinine 0 92 0 60 - 1 30 mg/dL     Comment: Standardized to IDMS reference method       Glucose 356 (H) 65 - 140 mg/dL     Comment:   If the patient is fasting, the ADA then defines impaired fasting glucose as > 100 mg/dL and diabetes as > or equal to 123 mg/dL  Specimen collection should occur prior to Sulfasalazine administration due to the potential for falsely depressed results  Specimen collection should occur prior to Sulfapyridine administration due to the potential for falsely elevated results  Calcium 9 2 8 3 - 10 1 mg/dL      (H) 5 - 45 U/L     Comment: 18Slightly Hemolyzed; Results May be Affected   Specimen collection should occur prior to Sulfasalazine administration due to the potential for falsely depressed results   (H) 12 - 78 U/L     Comment:   Specimen collection should occur prior to Sulfasalazine administration due to the potential for falsely depressed results          Alkaline Phosphatase 127 (H) 46 - 116 U/L     Total Protein 8 7 (H) 6 4 - 8 2 g/dL     Albumin 5 1 (H) 3 5 - 5 0 g/dL     Total Bilirubin 1 60 (H) 0 20 - 1 00 mg/dL     eGFR 104 ml/min/1 73sq m      WBC 6 97 4 31 - 10 16 Thousand/uL    RBC 5 08 3 88 - 5 62 Million/uL    Hemoglobin 16 2 12 0 - 17 0 g/dL    Hematocrit 47 2 36 5 - 49 3 %      ph, Russ ISTAT 7 341 7 300 - 7 400    pCO2, Russ i-STAT 30 6 (L) 42 0 - 50 0 mm HG    pO2, Russ i-STAT 55 0 (H) 35 0 - 45 0 mm HG    BE, i-STAT -8 (L) -2 - 3 mmol/L    HCO3, Russ i-STAT 16 5 (LL) 24 0 - 30 0 mmol/L      Color, UA Yellow   Clarity, UA Clear   Specific Gravity, UA >=1 030 1 003 - 1 030    pH, UA 5 5 4 5 - 8 0    Leukocytes, UA Negative Negative    Nitrite, UA Negative Negative    Protein, UA Trace (A) Negative mg/dl    Glucose,  (1/2%) (A) Negative mg/dl    Ketones, UA >=80 (3+) (A) Negative mg/dl    Urobilinogen, UA 0 2 0 2, 1 0 E U /dl E U /dl    Bilirubin, UA Negative Negative    Blood, UA Trace-lysed (A) Negative      ED Treatment:   Medication Administration from 10/27/2017 2003 to 10/28/2017 0111    Date/Time Order Dose Route Action Action by Comments   10/27/2017 2058 sodium chloride 0 9 % bolus 1,000 mL 1,000 mL Intravenous Given Aura Mayer RN    10/27/2017 2109 metoclopramide (REGLAN) injection 10 mg 10 mg Intravenous Given Miguelito Campos RN    10/27/2017 2233 ondansetron (ZOFRAN) injection 4 mg 4 mg Intravenous Given Miguelito Campos RN    10/27/2017 2254 insulin detemir (LEVEMIR) subcutaneous injection 30 Units 30 Units Subcutaneous Given Miguelito Campos RN    10/27/2017 2246 iohexol (OMNIPAQUE) 350 MG/ML injection (MULTI-DOSE) 90 mL 90 mL Intravenous Given Amaury Martinez    10/27/2017 2246 iohexol (OMNIPAQUE) 240 MG/ML solution 50 mL 50 mL Oral Given Amaury Martinez PO   10/27/2017 2314 LORazepam (ATIVAN) 2 mg/mL injection 1 mg 1 mg Intravenous Given Miguelito Campos RN    10/28/2017 0019 sodium chloride 0 9 % bolus 1,000 mL 1,000 mL Intravenous Continue to Inpatient Floor Miguelito Campos  mL infused   10/27/2017 2315 sodium chloride 0 9 % bolus 1,000 mL 1,000 mL Intravenous Given Miguelito Campos RN      Past Medical/Surgical History: Active Ambulatory Problems     Diagnosis Date Noted    Type 1 diabetes mellitus ([facility] ) 07/03/2017    Narcotic dependency, continuous ([facility] ) 07/03/2017    Gastroenteritis, acute 07/03/2017    Hypoglycemia 07/03/2017     Past Medical History:   Diagnosis Date    Diabetes mellitus ([facility] )     Narcotic abuse        Admitting Diagnosis: Intractable vomiting [R11 10]  Acute colitis [K52 9]  Vomiting in adult [R11 10]  Transaminasemia [R74 0]  Hyperglycemia due to type 1 diabetes mellitus ([facility] ) [E10 65]    Age/Sex: 46 y o  male    Assessment/Plan:    Diabetic ketoacidosis without coma associated with type 1 diabetes mellitus (HCC)  Active Problems:    Colitis    Type 1 diabetes mellitus (HCC)    Vomiting, persistent, in adult    Hyperglycemia    Constipation  Resolved Problems:    * No resolved hospital problems  *     Plan:  1  Diabetic ketoacidosis from type 1 diabetes secondary to colitis infection  Patient's blood glucose is actually relatively controlled currently at 0222 hours however his anion gap on his last BMP was 24  Will start IV fluids with D5 normal saline and an insulin drip at 2 units an hour with hourly blood glucose checks titrate to keep blood glucose 150-200       2  Colitis with constipation  Patient states he has not had a bowel movement in 2 weeks  He tried some over-the-counter stool softeners without relief  Will give fleets enema, soapsuds enema if needed  MiraLax b i d , Colace b i d , senna b i d   Patient is on methadone which is clearly not helping the constipation however his CT scan shows severe hepatics steatosis, hepatomegaly measuring 21 cm,   Abundant fecal material in the transverse descending and sigmoid colon without bowel obstruction, Under distention versus colitis of the ascending colon with submucosal bowel wall thickening /edema, likely infectious or inflammatory colitis  Will consult GI     3  Persistent vomiting: Likely secondary to diabetic ketoacidosis with anion gap  Will medicate with IV fluids and Zofran p r n  Start diabetic clear liquids as tolerated     4  History of narcotic abuse  Pt is on methadone  Gets it at the [facility] in [facility]  They are open from 7-9:30  Given his dose of methadone 115 mg tonight   He has not been able to keep it down and I suspect some withdrawal  is contributing to his nausea       Admission Orders:  Scheduled Meds:   docusate sodium 100 mg Oral BID   insulin lispro 1-5 Units Subcutaneous TID AC   insulin lispro 1-5 Units Subcutaneous HS   methadone 115 mg Oral Daily   pantoprazole 40 mg Intravenous Q24H ANDREA   polyethylene glycol 17 g Oral BID (diuretic)   senna 2 tablet Oral BID     Continuous Infusions:   IV D5/NSS @ 250ml/h  DC [email or web address]  IV insulin gtt DC 10/28 @ 1322    PRN Meds: ondansetron    Diabetic Clear Liq diet  Accucheck  TELM

## 2017-10-28 NOTE — ED NOTES
Pt tolerating half of CT PO contrast, unable to drink more  Anti-emetic ordered       Julio Mcmahan RN  10/27/17 2051

## 2017-10-29 LAB
ANION GAP SERPL CALCULATED.3IONS-SCNC: 10 MMOL/L (ref 4–13)
BASOPHILS # BLD AUTO: 0.01 THOUSANDS/ΜL (ref 0–0.1)
BASOPHILS NFR BLD AUTO: 0 % (ref 0–1)
BUN SERPL-MCNC: 8 MG/DL (ref 5–25)
CALCIUM SERPL-MCNC: 8.7 MG/DL (ref 8.3–10.1)
CHLORIDE SERPL-SCNC: 99 MMOL/L (ref 100–108)
CO2 SERPL-SCNC: 28 MMOL/L (ref 21–32)
CREAT SERPL-MCNC: 0.67 MG/DL (ref 0.6–1.3)
EOSINOPHIL # BLD AUTO: 0.06 THOUSAND/ΜL (ref 0–0.61)
EOSINOPHIL NFR BLD AUTO: 1 % (ref 0–6)
ERYTHROCYTE [DISTWIDTH] IN BLOOD BY AUTOMATED COUNT: 13.8 % (ref 11.6–15.1)
GFR SERPL CREATININE-BSD FRML MDRD: 121 ML/MIN/1.73SQ M
GLUCOSE SERPL-MCNC: 150 MG/DL (ref 65–140)
GLUCOSE SERPL-MCNC: 156 MG/DL (ref 65–140)
GLUCOSE SERPL-MCNC: 311 MG/DL (ref 65–140)
GLUCOSE SERPL-MCNC: 313 MG/DL (ref 65–140)
GLUCOSE SERPL-MCNC: 98 MG/DL (ref 65–140)
HCT VFR BLD AUTO: 42.4 % (ref 36.5–49.3)
HGB BLD-MCNC: 14.7 G/DL (ref 12–17)
LYMPHOCYTES # BLD AUTO: 1.9 THOUSANDS/ΜL (ref 0.6–4.47)
LYMPHOCYTES NFR BLD AUTO: 39 % (ref 14–44)
MCH RBC QN AUTO: 32.1 PG (ref 26.8–34.3)
MCHC RBC AUTO-ENTMCNC: 34.7 G/DL (ref 31.4–37.4)
MCV RBC AUTO: 93 FL (ref 82–98)
MONOCYTES # BLD AUTO: 0.39 THOUSAND/ΜL (ref 0.17–1.22)
MONOCYTES NFR BLD AUTO: 8 % (ref 4–12)
NEUTROPHILS # BLD AUTO: 2.49 THOUSANDS/ΜL (ref 1.85–7.62)
NEUTS SEG NFR BLD AUTO: 52 % (ref 43–75)
PLATELET # BLD AUTO: 126 THOUSANDS/UL (ref 149–390)
PMV BLD AUTO: 10.8 FL (ref 8.9–12.7)
POTASSIUM SERPL-SCNC: 3.3 MMOL/L (ref 3.5–5.3)
RBC # BLD AUTO: 4.58 MILLION/UL (ref 3.88–5.62)
SODIUM SERPL-SCNC: 137 MMOL/L (ref 136–145)
WBC # BLD AUTO: 4.85 THOUSAND/UL (ref 4.31–10.16)

## 2017-10-29 PROCEDURE — 85025 COMPLETE CBC W/AUTO DIFF WBC: CPT | Performed by: INTERNAL MEDICINE

## 2017-10-29 PROCEDURE — 82948 REAGENT STRIP/BLOOD GLUCOSE: CPT

## 2017-10-29 PROCEDURE — C9113 INJ PANTOPRAZOLE SODIUM, VIA: HCPCS | Performed by: INTERNAL MEDICINE

## 2017-10-29 PROCEDURE — 80048 BASIC METABOLIC PNL TOTAL CA: CPT | Performed by: INTERNAL MEDICINE

## 2017-10-29 RX ORDER — POTASSIUM CHLORIDE 20 MEQ/1
20 TABLET, EXTENDED RELEASE ORAL 2 TIMES DAILY WITH MEALS
Status: COMPLETED | OUTPATIENT
Start: 2017-10-29 | End: 2017-10-29

## 2017-10-29 RX ORDER — DEXTROSE AND SODIUM CHLORIDE 5; .45 G/100ML; G/100ML
50 INJECTION, SOLUTION INTRAVENOUS CONTINUOUS
Status: DISCONTINUED | OUTPATIENT
Start: 2017-10-30 | End: 2017-10-30

## 2017-10-29 RX ORDER — DEXTROSE AND SODIUM CHLORIDE 5; .45 G/100ML; G/100ML
50 INJECTION, SOLUTION INTRAVENOUS CONTINUOUS
Status: DISCONTINUED | OUTPATIENT
Start: 2017-10-29 | End: 2017-10-29

## 2017-10-29 RX ORDER — HYDROXYZINE HYDROCHLORIDE 10 MG/1
10 TABLET, FILM COATED ORAL
Status: DISCONTINUED | OUTPATIENT
Start: 2017-10-29 | End: 2017-11-02 | Stop reason: HOSPADM

## 2017-10-29 RX ADMIN — SENNOSIDES 17.2 MG: 8.6 TABLET, FILM COATED ORAL at 17:13

## 2017-10-29 RX ADMIN — POTASSIUM CHLORIDE 20 MEQ: 1500 TABLET, EXTENDED RELEASE ORAL at 11:46

## 2017-10-29 RX ADMIN — INSULIN LISPRO 2 UNITS: 100 INJECTION, SOLUTION INTRAVENOUS; SUBCUTANEOUS at 12:48

## 2017-10-29 RX ADMIN — INSULIN DETEMIR 30 UNITS: 100 INJECTION, SOLUTION SUBCUTANEOUS at 21:16

## 2017-10-29 RX ADMIN — DOCUSATE SODIUM 100 MG: 100 CAPSULE, LIQUID FILLED ORAL at 09:05

## 2017-10-29 RX ADMIN — METHADONE HYDROCHLORIDE 115 MG: 10 TABLET ORAL at 09:06

## 2017-10-29 RX ADMIN — SENNOSIDES 17.2 MG: 8.6 TABLET, FILM COATED ORAL at 09:05

## 2017-10-29 RX ADMIN — DULOXETINE 30 MG: 30 CAPSULE, DELAYED RELEASE ORAL at 09:05

## 2017-10-29 RX ADMIN — INSULIN LISPRO 8 UNITS: 100 INJECTION, SOLUTION INTRAVENOUS; SUBCUTANEOUS at 17:16

## 2017-10-29 RX ADMIN — PANTOPRAZOLE SODIUM 40 MG: 40 INJECTION, POWDER, FOR SOLUTION INTRAVENOUS at 09:07

## 2017-10-29 RX ADMIN — ALPRAZOLAM 0.25 MG: 0.25 TABLET ORAL at 14:48

## 2017-10-29 RX ADMIN — HYDROXYZINE HYDROCHLORIDE 10 MG: 10 TABLET ORAL at 21:16

## 2017-10-29 RX ADMIN — POTASSIUM CHLORIDE 20 MEQ: 1500 TABLET, EXTENDED RELEASE ORAL at 17:13

## 2017-10-29 RX ADMIN — ENOXAPARIN SODIUM 40 MG: 40 INJECTION SUBCUTANEOUS at 11:46

## 2017-10-29 RX ADMIN — DOCUSATE SODIUM 100 MG: 100 CAPSULE, LIQUID FILLED ORAL at 17:13

## 2017-10-29 RX ADMIN — PROPRANOLOL HYDROCHLORIDE 20 MG: 20 TABLET ORAL at 20:28

## 2017-10-29 RX ADMIN — NICOTINE 14 MG: 14 PATCH, EXTENDED RELEASE TRANSDERMAL at 09:18

## 2017-10-29 RX ADMIN — POLYETHYLENE GLYCOL 3350 17 G: 17 POWDER, FOR SOLUTION ORAL at 09:04

## 2017-10-29 RX ADMIN — ONDANSETRON 4 MG: 2 INJECTION INTRAMUSCULAR; INTRAVENOUS at 08:07

## 2017-10-29 RX ADMIN — POLYETHYLENE GLYCOL 3350 17 G: 17 POWDER, FOR SOLUTION ORAL at 17:13

## 2017-10-29 NOTE — PROGRESS NOTES
Patient states he is feeling extremely anxious and has been struggling with clinical depression due to his life situations  I am hesitant to give him many benzodiazepines due to his struggles with narcotic addiction so I am ordering p o  propanolol and he is willing to try starting on an antidepressant again  He has tried Zoloft and some other antidepressants that he is unsure of the name in the past and he did not feel well on them  He states he did not feel that they improved his depression in fact he felt worse on them  I am starting him on Cymbalta as the SNRI may be more effective  He got up to ambulate to the bathroom and his heart rate jumped to 150 beats per minute of sinus tachycardia    immedially upon returning to bed he was back at 75 beats per minute  He states he was feeling anxious  He had been ambulating around the unit earlier and his heart rate did not go above 100  He does not appear clinically dehydrated so I do think this is anxiety driven  He does not have a PCP in this area as he just moved here so I am giving him information for Dr Pearl to follow up as an outpatient as I think this would be a good therapeutic relationship for him

## 2017-10-29 NOTE — CONSULTS
Josh Owens  [id number]    46 y o   male      ASSESSMENT  1  Recurrent nausea and vomiting  Possibly related to DKA  Gastroparesis related to diabetes and methadone use another consideration  Calculi exclude peptic ulcer disease with GERD  Atypical biliary disease also consideration with increased LFTs  2  Insulin-dependent diabetes mellitus with recurrent DKA secondary to noncompliance  No complaints of complications of his diabetes  3  History of opioid abuse current we on methadone  4  Increased LFTs with hepatomegaly on CT scan  Suspect fatty liver  Atypical biliary disease also consideration to tie in with intermittent nausea vomiting  Other etiologies need to be considered      PLAN  IV fluids  Aggressive glucose control  EGD in a m  Continue PPI and Zofran  Abdominal ultrasound  Serologic workup for increased liver function tests    Chief Complaint   Patient presents with    Vomiting     Presents with C/O vomiting x 3 weeks, no bowel movement x 2 weeks  Ran out of insulin yesterday  SUBJECTIVE/HPI   GI is asked to evaluate the patient secondary to persistent nausea and vomiting  He has a known history of insulin dependent diabetes mellitus with medical noncompliance who was admitted with DKA  He has a history of previous opioid abuse and is on chronic methadone  He has had several recent emergency room visits and admissions secondary to nausea vomiting  He reports most of the time is nausea vomiting seem to correlate with his sugars getting out of control however over the last month he thinks the nausea vomiting is independent of his sugars  He reports that he will vomit whether he eats or not but eating tends to make it worse  He denies any dysphagia, odynophagia, or early satiety  He reports some heartburn but denies any regurgitation or chest pain  He denies any abdominal pain  He reports that he usually runs on the constipated side but seems to be more so recently    He denies any GI bleeding  His weight is stable   Denies any NSAID use    /96   Pulse 74   Temp 98 °F (36 7 °C) (Oral)   Resp 18   Ht 6' 4" (1 93 m)   Wt 89 kg (196 lb 3 4 oz)   SpO2 97%   BMI 23 88 kg/m²     PHYSICALEXAM  General appearance: alert, appears stated age and cooperative  Head: Normocephalic, without obvious abnormality, atraumatic  Lungs: clear to auscultation bilaterally  Heart: regular rate and rhythm, S1, S2 normal, no murmur, click, rub or gallop  Abdomen: soft, non-tender; bowel sounds normal; no masses,  no organomegaly  Extremities: extremities normal, atraumatic, no cyanosis or edema  Neurologic: Grossly normal    Lab Results   Component Value Date    GLUCOSE 150 (H) 10/29/2017    CALCIUM 8 7 10/29/2017     10/29/2017    K 3 3 (L) 10/29/2017    CO2 28 10/29/2017    CL 99 (L) 10/29/2017    BUN 8 10/29/2017    CREATININE 0 67 10/29/2017     Lab Results   Component Value Date    WBC 4 85 10/29/2017    HGB 14 7 10/29/2017    HCT 42 4 10/29/2017    MCV 93 10/29/2017     (L) 10/29/2017     Lab Results   Component Value Date     (H) 10/27/2017     (H) 10/27/2017    ALKPHOS 127 (H) 10/27/2017    BILITOT 1 60 (H) 10/27/2017     No components found for: AMYLJKJJJASE  Lab Results   Component Value Date    LIPASE 68 (L) 10/27/2017     No results found for: IRON, TIBC, FERRITIN  Lab Results   Component Value Date    INR 1 05 10/27/2017

## 2017-10-29 NOTE — PROGRESS NOTES
Progress Note - Baylee Huang 46 y o  male MRN: [id number]    Unit/Bed#: [address] [place] [address] [id number] Encounter: [id number]      Assessment:    Principal Problem:    Diabetic ketoacidosis without coma associated with type 1 diabetes mellitus ([address] )  Active Problems:    Type 1 diabetes mellitus (HCC)    Colitis    Vomiting, persistent, in adult    Hyperglycemia    Constipation  Resolved Problems:    * No resolved hospital problems  *      Plan:  Appreciate GI input  Patient will have an ultrasound of the liver tomorrow any EGD  Patient's glucoses are coming under control  I do believe there is an element of depression, and Cymbalta has been ordered  Will order an IV with dextrose for the morning before the EGD    Subjective:   No further nausea vomiting since receiving Zofran in the ED  Objective:     Vitals: Blood pressure 133/96, pulse 74, temperature 98 °F (36 7 °C), temperature source Oral, resp  rate 18, height 6' 4" (1 93 m), weight 89 kg (196 lb 3 4 oz), SpO2 97 %  ,Body mass index is 23 88 kg/m²  Intake/Output Summary (Last 24 hours) at 10/29/17 0935  Last data filed at 10/28/17 1343   Gross per 24 hour   Intake                0 ml   Output              800 ml   Net             -800 ml       Physical Exam:    Alert and awake in no acute distress  Lungs clear to auscultation bilaterally  Heart regular rate and rythm, normal heart sounds  Abdomen soft, active bowel sounds, non-tender, non-distended  Extremities: no cyanosis, clubbing or edema        Invasive Devices     Peripheral Intravenous Line            Peripheral IV 10/27/17 Right Antecubital 1 day    Peripheral IV 10/28/17 Right Wrist 1 day                            Lab, Imaging and other studies: I have personally reviewed pertinent reports         Results from last 7 days  Lab Units 10/29/17  0517 10/27/17  2048 10/27/17  2035   WBC Thousand/uL 4 85  --  6 97   HEMOGLOBIN g/dL 14 7  --  16 2   I STAT HEMOGLOBIN g/dl  --  18 0*  --    HEMATOCRIT % 42 4  --  47 2   PLATELETS Thousands/uL 126*  --  129*   NEUTROS PCT % 52  --  82*   LYMPHS PCT % 39  --  12*   MONOS PCT % 8  --  5   EOS PCT % 1  --  1       Results from last 7 days  Lab Units 10/29/17  0517 10/28/17  1228 10/28/17  0634  10/27/17  2035   SODIUM mmol/L 137 134* 131*  < > 130*   POTASSIUM mmol/L 3 3* 3 8 3 7  < > 5 0   CHLORIDE mmol/L 99* 100 97*  < > 86*   CO2 mmol/L 28 24 19*  < > 15*   BUN mg/dL 8 8 10  < > 14   CREATININE mg/dL 0 67 0 74 0 84  < > 0 92   CALCIUM mg/dL 8 7 8 0* 7 7*  < > 9 2   TOTAL PROTEIN g/dL  --   --   --   --  8 7*   BILIRUBIN TOTAL mg/dL  --   --   --   --  1 60*   ALK PHOS U/L  --   --   --   --  127*   ALT U/L  --   --   --   --  178*   AST U/L  --   --   --   --  149*   GLUCOSE RANDOM mg/dL 150* 186* 243*  < > 356*   GLUCOSE, ISTAT   --   --   --   < >  --    < > = values in this interval not displayed  No results found for: Ilsa Gates    Results from last 7 days  Lab Units 10/27/17  2035   INR  1 05     No results found for: Jose Jeong, SPUTUMCULTUR    Imaging:  No results found for this or any previous visit  No results found for this or any previous visit  PATIENT CENTERED ROUNDS: I have performed rounds with the nursing staff  This note has been constructed using a voice recognition system      Bianca Teran MD

## 2017-10-30 ENCOUNTER — ANESTHESIA EVENT (INPATIENT)
Dept: PERIOP | Facility: HOSPITAL | Age: 42
DRG: 249 | End: 2017-10-30
Payer: COMMERCIAL

## 2017-10-30 ENCOUNTER — ANESTHESIA (INPATIENT)
Dept: PERIOP | Facility: HOSPITAL | Age: 42
DRG: 249 | End: 2017-10-30
Payer: COMMERCIAL

## 2017-10-30 ENCOUNTER — APPOINTMENT (INPATIENT)
Dept: ULTRASOUND IMAGING | Facility: HOSPITAL | Age: 42
DRG: 249 | End: 2017-10-30
Payer: COMMERCIAL

## 2017-10-30 LAB
ANION GAP SERPL CALCULATED.3IONS-SCNC: 10 MMOL/L (ref 4–13)
ATRIAL RATE: 82 BPM
BUN SERPL-MCNC: 8 MG/DL (ref 5–25)
CALCIUM SERPL-MCNC: 9.3 MG/DL (ref 8.3–10.1)
CHLORIDE SERPL-SCNC: 99 MMOL/L (ref 100–108)
CO2 SERPL-SCNC: 26 MMOL/L (ref 21–32)
CREAT SERPL-MCNC: 0.6 MG/DL (ref 0.6–1.3)
FERRITIN SERPL-MCNC: 505 NG/ML (ref 8–388)
GFR SERPL CREATININE-BSD FRML MDRD: 127 ML/MIN/1.73SQ M
GLUCOSE SERPL-MCNC: 103 MG/DL (ref 65–140)
GLUCOSE SERPL-MCNC: 156 MG/DL (ref 65–140)
GLUCOSE SERPL-MCNC: 175 MG/DL (ref 65–140)
GLUCOSE SERPL-MCNC: 221 MG/DL (ref 65–140)
GLUCOSE SERPL-MCNC: 268 MG/DL (ref 65–140)
GLUCOSE SERPL-MCNC: 294 MG/DL (ref 65–140)
IRON SATN MFR SERPL: 26 %
IRON SERPL-MCNC: 79 UG/DL (ref 65–175)
P AXIS: 57 DEGREES
PLATELET # BLD AUTO: 125 THOUSANDS/UL (ref 149–390)
PMV BLD AUTO: 10 FL (ref 8.9–12.7)
POTASSIUM SERPL-SCNC: 3.6 MMOL/L (ref 3.5–5.3)
PR INTERVAL: 148 MS
QRS AXIS: 79 DEGREES
QRSD INTERVAL: 92 MS
QT INTERVAL: 402 MS
QTC INTERVAL: 469 MS
SODIUM SERPL-SCNC: 135 MMOL/L (ref 136–145)
T WAVE AXIS: 50 DEGREES
TIBC SERPL-MCNC: 300 UG/DL (ref 250–450)
VENTRICULAR RATE: 82 BPM

## 2017-10-30 PROCEDURE — 82103 ALPHA-1-ANTITRYPSIN TOTAL: CPT | Performed by: INTERNAL MEDICINE

## 2017-10-30 PROCEDURE — 86235 NUCLEAR ANTIGEN ANTIBODY: CPT | Performed by: INTERNAL MEDICINE

## 2017-10-30 PROCEDURE — 85049 AUTOMATED PLATELET COUNT: CPT | Performed by: INTERNAL MEDICINE

## 2017-10-30 PROCEDURE — 87340 HEPATITIS B SURFACE AG IA: CPT | Performed by: INTERNAL MEDICINE

## 2017-10-30 PROCEDURE — 87077 CULTURE AEROBIC IDENTIFY: CPT | Performed by: INTERNAL MEDICINE

## 2017-10-30 PROCEDURE — 83540 ASSAY OF IRON: CPT | Performed by: INTERNAL MEDICINE

## 2017-10-30 PROCEDURE — C9113 INJ PANTOPRAZOLE SODIUM, VIA: HCPCS | Performed by: INTERNAL MEDICINE

## 2017-10-30 PROCEDURE — 76705 ECHO EXAM OF ABDOMEN: CPT

## 2017-10-30 PROCEDURE — 86803 HEPATITIS C AB TEST: CPT | Performed by: INTERNAL MEDICINE

## 2017-10-30 PROCEDURE — 83550 IRON BINDING TEST: CPT | Performed by: INTERNAL MEDICINE

## 2017-10-30 PROCEDURE — 80048 BASIC METABOLIC PNL TOTAL CA: CPT | Performed by: INTERNAL MEDICINE

## 2017-10-30 PROCEDURE — 82728 ASSAY OF FERRITIN: CPT | Performed by: INTERNAL MEDICINE

## 2017-10-30 PROCEDURE — 82948 REAGENT STRIP/BLOOD GLUCOSE: CPT

## 2017-10-30 PROCEDURE — 0DB68ZX EXCISION OF STOMACH, VIA NATURAL OR ARTIFICIAL OPENING ENDOSCOPIC, DIAGNOSTIC: ICD-10-PCS | Performed by: INTERNAL MEDICINE

## 2017-10-30 PROCEDURE — 82390 ASSAY OF CERULOPLASMIN: CPT | Performed by: INTERNAL MEDICINE

## 2017-10-30 PROCEDURE — 86256 FLUORESCENT ANTIBODY TITER: CPT | Performed by: INTERNAL MEDICINE

## 2017-10-30 PROCEDURE — 88305 TISSUE EXAM BY PATHOLOGIST: CPT | Performed by: INTERNAL MEDICINE

## 2017-10-30 PROCEDURE — 0DB98ZX EXCISION OF DUODENUM, VIA NATURAL OR ARTIFICIAL OPENING ENDOSCOPIC, DIAGNOSTIC: ICD-10-PCS | Performed by: INTERNAL MEDICINE

## 2017-10-30 PROCEDURE — 86706 HEP B SURFACE ANTIBODY: CPT | Performed by: INTERNAL MEDICINE

## 2017-10-30 PROCEDURE — 86038 ANTINUCLEAR ANTIBODIES: CPT | Performed by: INTERNAL MEDICINE

## 2017-10-30 RX ORDER — SODIUM CHLORIDE 9 MG/ML
INJECTION, SOLUTION INTRAVENOUS CONTINUOUS PRN
Status: DISCONTINUED | OUTPATIENT
Start: 2017-10-30 | End: 2017-10-30 | Stop reason: SURG

## 2017-10-30 RX ORDER — PROPOFOL 10 MG/ML
INJECTION, EMULSION INTRAVENOUS AS NEEDED
Status: DISCONTINUED | OUTPATIENT
Start: 2017-10-30 | End: 2017-10-30 | Stop reason: SURG

## 2017-10-30 RX ADMIN — ENOXAPARIN SODIUM 40 MG: 40 INJECTION SUBCUTANEOUS at 09:11

## 2017-10-30 RX ADMIN — SENNOSIDES 17.2 MG: 8.6 TABLET, FILM COATED ORAL at 17:08

## 2017-10-30 RX ADMIN — ONDANSETRON 4 MG: 2 INJECTION INTRAMUSCULAR; INTRAVENOUS at 08:54

## 2017-10-30 RX ADMIN — DULOXETINE 30 MG: 30 CAPSULE, DELAYED RELEASE ORAL at 09:08

## 2017-10-30 RX ADMIN — ALPRAZOLAM 0.25 MG: 0.25 TABLET ORAL at 06:21

## 2017-10-30 RX ADMIN — METHADONE HYDROCHLORIDE 115 MG: 10 TABLET ORAL at 09:04

## 2017-10-30 RX ADMIN — PROPOFOL 75 MG: 10 INJECTION, EMULSION INTRAVENOUS at 14:02

## 2017-10-30 RX ADMIN — SODIUM CHLORIDE: 0.9 INJECTION, SOLUTION INTRAVENOUS at 13:26

## 2017-10-30 RX ADMIN — INSULIN LISPRO 6 UNITS: 100 INJECTION, SOLUTION INTRAVENOUS; SUBCUTANEOUS at 17:09

## 2017-10-30 RX ADMIN — DOCUSATE SODIUM 100 MG: 100 CAPSULE, LIQUID FILLED ORAL at 17:08

## 2017-10-30 RX ADMIN — DEXTROSE AND SODIUM CHLORIDE 50 ML/HR: 5; .45 INJECTION, SOLUTION INTRAVENOUS at 05:25

## 2017-10-30 RX ADMIN — ALPRAZOLAM 0.25 MG: 0.25 TABLET ORAL at 11:29

## 2017-10-30 RX ADMIN — PROPOFOL 25 MG: 10 INJECTION, EMULSION INTRAVENOUS at 14:04

## 2017-10-30 RX ADMIN — NICOTINE 14 MG: 14 PATCH, EXTENDED RELEASE TRANSDERMAL at 09:08

## 2017-10-30 RX ADMIN — POLYETHYLENE GLYCOL 3350 17 G: 17 POWDER, FOR SOLUTION ORAL at 17:08

## 2017-10-30 RX ADMIN — POLYETHYLENE GLYCOL 3350 17 G: 17 POWDER, FOR SOLUTION ORAL at 09:11

## 2017-10-30 RX ADMIN — INSULIN DETEMIR 30 UNITS: 100 INJECTION, SOLUTION SUBCUTANEOUS at 21:27

## 2017-10-30 RX ADMIN — PANTOPRAZOLE SODIUM 40 MG: 40 INJECTION, POWDER, FOR SOLUTION INTRAVENOUS at 09:12

## 2017-10-30 RX ADMIN — DOCUSATE SODIUM 100 MG: 100 CAPSULE, LIQUID FILLED ORAL at 09:11

## 2017-10-30 RX ADMIN — SENNOSIDES 17.2 MG: 8.6 TABLET, FILM COATED ORAL at 09:17

## 2017-10-30 NOTE — OP NOTE
**** GI/ENDOSCOPY REPORT ****     PATIENT NAME: MARICHUY Monterroso - VISIT ID:  Patient ID:   [id number] YOB: 1975     INTRODUCTION: Esophagogastroduodenoscopy - A 46 male patient presents for   an inpatient Esophagogastroduodenoscopy at [facility]  INDICATIONS: Nausea  CONSENT: The benefits, risks, and alternatives to the procedure were   discussed and informed consent was obtained from the patient  PREPARATION:  EKG, pulse, pulse oximetry and blood pressure were monitored   throughout the procedure  MEDICATIONS:     PROCEDURE:  The endoscope was passed without difficulty through the mouth   under direct visualization and advanced to the 2nd portion of the   duodenum  The scope was withdrawn and the mucosa was carefully examined  FINDINGS:   Duodenum: The duodenum appeared to be normal   Two random   biopsies was taken  Stomach: Gastritis was found in the antrum and body   of the stomach  Appearances were bile reflux  Two biopsies was taken  The   specimens were collected for izaiah test    Esophagus: The esophagus appeared   to be normal      COMPLICATIONS: There were no complications  IMPRESSIONS: Normal duodenum  Two biopsies taken  Gastritis found in the   antrum and body of the stomach, bile reflux  Two biopsies taken  Normal   esophagus  RECOMMENDATIONS: Resume regular diet as tolerated  Continue current   medications  Follow-up on the results of the biopsy specimens in 2 weeks  ESTIMATED BLOOD LOSS:     PATHOLOGY SPECIMENS: Two random biopsies taken  Two biopsies taken for izaiah   test  Associated finding: Gastritis       PROCEDURE CODES: 41535 - EGD flexible; with biopsy     ICD-9 Codes: 787 01 Nausea with vomiting 535 50 Unspecified gastritides   and gastroduodenitis, without mention of hemorrhage     ICD-10 Codes: R11 2 Nausea with vomiting, unspecified K29 Gastritis and   duodenitis     PERFORMED BY: RACHAEL Diamond  on 10/30/2017  Version 1, electronically signed by RACHAEL Jade  on   10/30/2017 at 14:16

## 2017-10-30 NOTE — SOCIAL WORK
Met with patient  Explained role of care management  Patient lives in a second floor apartment alone with [address]  He is independent adl's and ambulation, drives, works  DME - glucometer  Past services - denies  He plans on returning home at discharge and does not anticipate any discharge needs  He states that he has a prescription plan, just needs a script for his Levemir  Will follow

## 2017-10-30 NOTE — ANESTHESIA PREPROCEDURE EVALUATION
Review of Systems/Medical History          Cardiovascular   Pulmonary       GI/Hepatic            Endo/Other  Diabetes ,      GYN       Hematology   Musculoskeletal       Neurology   Psychology     Chronic opioid dependence         Physical Exam    Airway    Mallampati score: I  TM Distance: >3 FB  Neck ROM: full     Dental       Cardiovascular  Rhythm: regular,     Pulmonary  Breath sounds clear to auscultation,     Other Findings        Anesthesia Plan  ASA Score- 2       Anesthesia Type- IV sedation with anesthesia with ASA Monitors  Additional Monitors:   Airway Plan:           Induction- intravenous  Informed Consent- Anesthetic plan and risks discussed with patient

## 2017-10-30 NOTE — PROGRESS NOTES
Progress Note - Daphnie Ortiz 46 y o  male MRN: [id number]  Unit/Bed#: [address] [place] [address] 208-02 Encounter: [id number]    Assessment:  Principal Problem:    Diabetic ketoacidosis without coma associated with type 1 diabetes mellitus ([address] )  Active Problems:    Type 1 diabetes mellitus (HCC)    Colitis    Vomiting, persistent, in adult    Hyperglycemia    Constipation  Resolved Problems:    * No resolved hospital problems  *      Plan:  · Diabetic ketoacidosis-resolved  · Type 1 diabetes mellitus  Continue on Levemir  Accu-Chek a c  HS with Humalog sliding scale  · Recurrent nausea and vomiting possibly related to DKA versus gastroparesis  Patient is scheduled for EGD today  Abdominal ultrasound  Follow up LFTs  · History of opioid abuse- on methadone  · Depression-started on Cymbalta  DVT prophylaxis  Discussed with patient    Subjective:   Patient is seen and examined at bedside  Patient's hospital course and events were reviewed  Still complains of having dry heaves  Denies any abdominal pain, diarrhea, chest pain or any other complaints  Patient is scheduled for EGD today  All other ROS are negative  Objective:   Vitals: Blood pressure 142/90, pulse 69, temperature 98 3 °F (36 8 °C), temperature source Oral, resp  rate 17, height 6' 4" (1 93 m), weight 90 1 kg (198 lb 10 2 oz), SpO2 97 %  ,Body mass index is 24 18 kg/m²  SPO2 RA Rest    Flowsheet Row ED to Hosp-Admission (Current) from 10/27/2017 in [facility] Surg Unit   SpO2  97 %   SpO2 Activity  At Rest   O2 Device  None (Room air)   O2 Flow Rate  No data        I&O: No intake or output data in the 24 hours ending 10/30/17 0851    Physical Exam:    General- Alert, lying comfortably in bed  Not in any acute distress  HEENT- RANDI, EOM intact  Neck- Supple, No JVD  CVS- regular, S1 and S2 normal   Chest- Bilateral Air entry, No rhochi, crackles or wheezing present    Abdomen- soft, nontender, not distended, no guarding or rigidity, BS+  Extremities-  No pedal edema, No calf tenderness                         Normal ROM in all extremities  CNS-   Alert, awake and orientedx3  No focal deficits present  Invasive Devices     Peripheral Intravenous Line            Peripheral IV 10/27/17 Right Antecubital 2 days                      Social History  reviewed  Family History   Problem Relation Age of Onset    Family history unknown:  Yes    reviewed    Meds:  Current Facility-Administered Medications   Medication Dose Route Frequency Provider Last Rate Last Dose    ALPRAZolam Etta Flirt) tablet 0 25 mg  0 25 mg Oral BID PRN ALESSIO Bernal   0 25 mg at 10/30/17 7422    dextrose 5 % and sodium chloride 0 45 % infusion  50 mL/hr Intravenous Continuous Ximena Riddle MD 50 mL/hr at 10/30/17 0525 50 mL/hr at 10/30/17 0525    docusate sodium (COLACE) capsule 100 mg  100 mg Oral BID ALESSIO Bernal   100 mg at 10/29/17 1713    DULoxetine (CYMBALTA) delayed release capsule 30 mg  30 mg Oral Daily ALESSIO Bernal   30 mg at 10/29/17 0905    enoxaparin (LOVENOX) subcutaneous injection 40 mg  40 mg Subcutaneous Daily Ximena Riddle MD   40 mg at 10/29/17 1146    hydrOXYzine HCL (ATARAX) tablet 10 mg  10 mg Oral HS PRN Tamy Vyas MD   10 mg at 10/29/17 2116    insulin detemir (LEVEMIR) subcutaneous injection 30 Units  30 Units Subcutaneous HS ALESSIO Bernal   30 Units at 10/29/17 2116    insulin lispro (HumaLOG) 100 units/mL subcutaneous injection 2-12 Units  2-12 Units [facility]   8 Units at 10/29/17 1716    insulin lispro (HumaLOG) 100 units/mL subcutaneous injection 2-12 Units  2-12 Units Subcutaneous HS ALESSIO Bernal   6 Units at 10/28/17 2245    methadone (DOLOPHINE) tablet 115 mg  115 mg Oral Daily Ximena Riddle MD   115 mg at 10/29/17 0906    nicotine (NICODERM CQ) 14 mg/24hr TD 24 hr patch 14 mg  14 mg Transdermal Daily Ximena Riddle MD   14 mg at 10/29/17 0918    ondansetron (ZOFRAN) injection 4 mg  4 mg Intravenous Q4H PRN MARCUS HancockNP   4 mg at 10/29/17 5190    pantoprazole (PROTONIX) injection 40 mg  40 mg Intravenous Q24H [facility] Silva Saucedo MD   40 mg at 10/29/17 7563    polyethylene glycol (MIRALAX) packet 17 g  17 g Oral BID (diuretic) MARCUS HancockNP   17 g at 10/29/17 1713    propranolol (INDERAL) tablet 20 mg  20 mg Oral Q4H PRN ALESSIO Hancock   20 mg at 10/29/17 2028    senna (SENOKOT) tablet 17 2 mg  2 tablet Oral BID Kel ALESSIO Wells   17 2 mg at 10/29/17 1713      Prescriptions Prior to Admission   Medication    insulin aspart (NovoLOG) 100 units/mL injection    insulin detemir (LEVEMIR) 100 units/mL subcutaneous injection    methadone (DOLOPHINE) 10 MG/5ML solution    ondansetron (ZOFRAN) 4 mg tablet       Labs:    Results from last 7 days  Lab Units 10/30/17  0435 10/29/17  0517 10/27/17  2048 10/27/17  2035   WBC Thousand/uL  --  4 85  --  6 97   HEMOGLOBIN g/dL  --  14 7  --  16 2   I STAT HEMOGLOBIN g/dl  --   --  18 0*  --    HEMATOCRIT %  --  42 4  --  47 2   PLATELETS Thousands/uL 125* 126*  --  129*   NEUTROS PCT %  --  52  --  82*   LYMPHS PCT %  --  39  --  12*   MONOS PCT %  --  8  --  5   EOS PCT %  --  1  --  1       Results from last 7 days  Lab Units 10/30/17  0435 10/29/17  0517 10/28/17  1228  10/27/17  2035   SODIUM mmol/L 135* 137 134*  < > 130*   POTASSIUM mmol/L 3 6 3 3* 3 8  < > 5 0   CHLORIDE mmol/L 99* 99* 100  < > 86*   CO2 mmol/L 26 28 24  < > 15*   BUN mg/dL 8 8 8  < > 14   CREATININE mg/dL 0 60 0 67 0 74  < > 0 92   CALCIUM mg/dL 9 3 8 7 8 0*  < > 9 2   TOTAL PROTEIN g/dL  --   --   --   --  8 7*   BILIRUBIN TOTAL mg/dL  --   --   --   --  1 60*   ALK PHOS U/L  --   --   --   --  127*   ALT U/L  --   --   --   --  178*   AST U/L  --   --   --   --  149*   GLUCOSE RANDOM mg/dL 156* 150* 186*  < > 356*   GLUCOSE, ISTAT   --   --   --   < >  --    < > = values in this interval not displayed    No results found for: TROPONINI, CKTOTAL    Results from last 7 days  Lab Units 10/27/17  2035   INR  1 05     No results found for: Darene Liter, SPUTUMCULTUR      Imaging:  No results found for this or any previous visit  No results found for this or any previous visit  Labs & Imaging: I have personally reviewed pertinent reports        VTE Pharmacologic Prophylaxis: Enoxaparin (Lovenox)  VTE Mechanical Prophylaxis: sequential compression device    Code Status:   Prior      "This note has been constructed using a voice recognition system"      Loulou Reynolds MD  10/30/2017,8:51 AM

## 2017-10-30 NOTE — ANESTHESIA POSTPROCEDURE EVALUATION
Post-Op Assessment Note      CV Status:  Stable    Mental Status:  Alert and awake    Hydration Status:  Euvolemic    PONV Controlled:  Controlled    Airway Patency:  Patent    Post Op Vitals Reviewed: Yes          Staff: Anesthesiologist, CRNA       Comments: anxious preop---now relieved          BP      Temp      Pulse     Resp      SpO2

## 2017-10-31 LAB
A1AT SERPL-MCNC: 108 MG/DL (ref 90–200)
ACTIN IGG SERPL-ACNC: 5 UNITS (ref 0–19)
ALBUMIN SERPL BCP-MCNC: 4 G/DL (ref 3.5–5)
ALP SERPL-CCNC: 107 U/L (ref 46–116)
ALT SERPL W P-5'-P-CCNC: 395 U/L (ref 12–78)
ANION GAP SERPL CALCULATED.3IONS-SCNC: 7 MMOL/L (ref 4–13)
AST SERPL W P-5'-P-CCNC: 437 U/L (ref 5–45)
BASOPHILS # BLD AUTO: 0.02 THOUSANDS/ΜL (ref 0–0.1)
BASOPHILS NFR BLD AUTO: 1 % (ref 0–1)
BILIRUB SERPL-MCNC: 1 MG/DL (ref 0.2–1)
BUN SERPL-MCNC: 11 MG/DL (ref 5–25)
CALCIUM SERPL-MCNC: 9 MG/DL (ref 8.3–10.1)
CERULOPLASMIN SERPL-MCNC: 20.3 MG/DL (ref 16–31)
CHLORIDE SERPL-SCNC: 97 MMOL/L (ref 100–108)
CO2 SERPL-SCNC: 31 MMOL/L (ref 21–32)
CREAT SERPL-MCNC: 0.69 MG/DL (ref 0.6–1.3)
EOSINOPHIL # BLD AUTO: 0.06 THOUSAND/ΜL (ref 0–0.61)
EOSINOPHIL NFR BLD AUTO: 2 % (ref 0–6)
ERYTHROCYTE [DISTWIDTH] IN BLOOD BY AUTOMATED COUNT: 13.8 % (ref 11.6–15.1)
GFR SERPL CREATININE-BSD FRML MDRD: 120 ML/MIN/1.73SQ M
GLUCOSE SERPL-MCNC: 211 MG/DL (ref 65–140)
GLUCOSE SERPL-MCNC: 259 MG/DL (ref 65–140)
GLUCOSE SERPL-MCNC: 281 MG/DL (ref 65–140)
GLUCOSE SERPL-MCNC: 360 MG/DL (ref 65–140)
GLUCOSE SERPL-MCNC: 363 MG/DL (ref 65–140)
HBV SURFACE AB SER-ACNC: <3.1 MIU/ML
HBV SURFACE AG SER QL: NORMAL
HCT VFR BLD AUTO: 44.3 % (ref 36.5–49.3)
HCV AB SER QL: NORMAL
HGB BLD-MCNC: 15.2 G/DL (ref 12–17)
LYMPHOCYTES # BLD AUTO: 1.06 THOUSANDS/ΜL (ref 0.6–4.47)
LYMPHOCYTES NFR BLD AUTO: 29 % (ref 14–44)
MCH RBC QN AUTO: 31.8 PG (ref 26.8–34.3)
MCHC RBC AUTO-ENTMCNC: 34.3 G/DL (ref 31.4–37.4)
MCV RBC AUTO: 93 FL (ref 82–98)
MITOCHONDRIA M2 IGG SER-ACNC: 4.4 UNITS (ref 0–20)
MONOCYTES # BLD AUTO: 0.36 THOUSAND/ΜL (ref 0.17–1.22)
MONOCYTES NFR BLD AUTO: 10 % (ref 4–12)
NEUTROPHILS # BLD AUTO: 2.13 THOUSANDS/ΜL (ref 1.85–7.62)
NEUTS SEG NFR BLD AUTO: 58 % (ref 43–75)
PLATELET # BLD AUTO: 138 THOUSANDS/UL (ref 149–390)
PMV BLD AUTO: 10.7 FL (ref 8.9–12.7)
POTASSIUM SERPL-SCNC: 4.2 MMOL/L (ref 3.5–5.3)
PROT SERPL-MCNC: 7.2 G/DL (ref 6.4–8.2)
RBC # BLD AUTO: 4.78 MILLION/UL (ref 3.88–5.62)
SODIUM SERPL-SCNC: 135 MMOL/L (ref 136–145)
UREASE TISS QL: NEGATIVE
WBC # BLD AUTO: 3.63 THOUSAND/UL (ref 4.31–10.16)

## 2017-10-31 PROCEDURE — 86663 EPSTEIN-BARR ANTIBODY: CPT | Performed by: NURSE PRACTITIONER

## 2017-10-31 PROCEDURE — 82948 REAGENT STRIP/BLOOD GLUCOSE: CPT

## 2017-10-31 PROCEDURE — 86644 CMV ANTIBODY: CPT | Performed by: NURSE PRACTITIONER

## 2017-10-31 PROCEDURE — 86664 EPSTEIN-BARR NUCLEAR ANTIGEN: CPT | Performed by: NURSE PRACTITIONER

## 2017-10-31 PROCEDURE — 85025 COMPLETE CBC W/AUTO DIFF WBC: CPT | Performed by: INTERNAL MEDICINE

## 2017-10-31 PROCEDURE — 86645 CMV ANTIBODY IGM: CPT | Performed by: NURSE PRACTITIONER

## 2017-10-31 PROCEDURE — 86665 EPSTEIN-BARR CAPSID VCA: CPT | Performed by: NURSE PRACTITIONER

## 2017-10-31 PROCEDURE — 80053 COMPREHEN METABOLIC PANEL: CPT | Performed by: INTERNAL MEDICINE

## 2017-10-31 PROCEDURE — C9113 INJ PANTOPRAZOLE SODIUM, VIA: HCPCS | Performed by: INTERNAL MEDICINE

## 2017-10-31 RX ORDER — DEXTROSE AND SODIUM CHLORIDE 5; .45 G/100ML; G/100ML
50 INJECTION, SOLUTION INTRAVENOUS CONTINUOUS
Status: DISCONTINUED | OUTPATIENT
Start: 2017-10-31 | End: 2017-11-02 | Stop reason: HOSPADM

## 2017-10-31 RX ORDER — BISACODYL 10 MG
10 SUPPOSITORY, RECTAL RECTAL ONCE
Status: COMPLETED | OUTPATIENT
Start: 2017-10-31 | End: 2017-10-31

## 2017-10-31 RX ADMIN — SENNOSIDES 17.2 MG: 8.6 TABLET, FILM COATED ORAL at 08:04

## 2017-10-31 RX ADMIN — INSULIN LISPRO 10 UNITS: 100 INJECTION, SOLUTION INTRAVENOUS; SUBCUTANEOUS at 17:21

## 2017-10-31 RX ADMIN — INSULIN LISPRO 10 UNITS: 100 INJECTION, SOLUTION INTRAVENOUS; SUBCUTANEOUS at 21:28

## 2017-10-31 RX ADMIN — ALPRAZOLAM 0.25 MG: 0.25 TABLET ORAL at 04:11

## 2017-10-31 RX ADMIN — INSULIN LISPRO 4 UNITS: 100 INJECTION, SOLUTION INTRAVENOUS; SUBCUTANEOUS at 07:54

## 2017-10-31 RX ADMIN — POLYETHYLENE GLYCOL 3350 17 G: 17 POWDER, FOR SOLUTION ORAL at 17:01

## 2017-10-31 RX ADMIN — ALPRAZOLAM 0.25 MG: 0.25 TABLET ORAL at 21:18

## 2017-10-31 RX ADMIN — PANTOPRAZOLE SODIUM 40 MG: 40 INJECTION, POWDER, FOR SOLUTION INTRAVENOUS at 08:02

## 2017-10-31 RX ADMIN — BISACODYL 10 MG: 10 SUPPOSITORY RECTAL at 14:31

## 2017-10-31 RX ADMIN — METHADONE HYDROCHLORIDE 115 MG: 10 TABLET ORAL at 08:03

## 2017-10-31 RX ADMIN — POLYETHYLENE GLYCOL 3350 17 G: 17 POWDER, FOR SOLUTION ORAL at 07:54

## 2017-10-31 RX ADMIN — ALPRAZOLAM 0.25 MG: 0.25 TABLET ORAL at 11:33

## 2017-10-31 RX ADMIN — DOCUSATE SODIUM 100 MG: 100 CAPSULE, LIQUID FILLED ORAL at 17:01

## 2017-10-31 RX ADMIN — INSULIN DETEMIR 30 UNITS: 100 INJECTION, SOLUTION SUBCUTANEOUS at 21:27

## 2017-10-31 RX ADMIN — INSULIN LISPRO 6 UNITS: 100 INJECTION, SOLUTION INTRAVENOUS; SUBCUTANEOUS at 11:33

## 2017-10-31 RX ADMIN — SENNOSIDES 17.2 MG: 8.6 TABLET, FILM COATED ORAL at 17:01

## 2017-10-31 RX ADMIN — ENOXAPARIN SODIUM 40 MG: 40 INJECTION SUBCUTANEOUS at 08:03

## 2017-10-31 RX ADMIN — NICOTINE 14 MG: 14 PATCH, EXTENDED RELEASE TRANSDERMAL at 08:06

## 2017-10-31 RX ADMIN — DULOXETINE 30 MG: 30 CAPSULE, DELAYED RELEASE ORAL at 08:04

## 2017-10-31 RX ADMIN — DOCUSATE SODIUM 100 MG: 100 CAPSULE, LIQUID FILLED ORAL at 08:04

## 2017-10-31 RX ADMIN — ONDANSETRON 4 MG: 2 INJECTION INTRAMUSCULAR; INTRAVENOUS at 07:54

## 2017-10-31 NOTE — PROGRESS NOTES
Progress Note - Sushma Brewer 46 y o  male MRN: [id number]  Unit/Bed#: [address] [place] [address] 208-02 Encounter: [id number]    Assessment:  Principal Problem:    Diabetic ketoacidosis without coma associated with type 1 diabetes mellitus ([address] )  Active Problems:    Type 1 diabetes mellitus (HCC)    Colitis    Vomiting, persistent, in adult    Hyperglycemia    Constipation  Resolved Problems:    * No resolved hospital problems  *      Plan:  · Diabetic ketoacidosis-resolved  · Type 1 diabetes mellitus  Continue on Levemir  Accu-Chek a c  HS with Humalog sliding scale  · Recurrent nausea and vomiting possibly related to DKA versus gastroparesis  EGD showed some gastritis  Abdominal ultrasound  Follow up LFTs  Follow-up serologies  Advance diet as tolerated  GI follow-up  · History of opioid abuse- on methadone  · Depression-started on Cymbalta  DVT prophylaxis  Discussed with patient  Subjective:   Patient is seen and examined at bedside  Complained of having nausea this morning  Had endoscopy done yesterday  Complains of not having a bowel movement  Denies any abdominal pain, chest pain, shortness of breath or any other complaints  All other ROS are negative  Objective:   Vitals: Blood pressure 133/83, pulse 74, temperature 98 1 °F (36 7 °C), temperature source Oral, resp  rate 18, height 6' 4" (1 93 m), weight 89 5 kg (197 lb 5 oz), SpO2 98 %  ,Body mass index is 24 02 kg/m²  SPO2 RA Rest    Flowsheet Row ED to Hosp-Admission (Current) from 10/27/2017 in [facility] Surg Unit   SpO2  98 %   SpO2 Activity  At Rest   O2 Device  None (Room air)   O2 Flow Rate  No data        I&O:   Intake/Output Summary (Last 24 hours) at 10/31/17 1155  Last data filed at 10/30/17 2131   Gross per 24 hour   Intake              100 ml   Output              450 ml   Net             -350 ml       Physical Exam:    General- Alert, lying comfortably in bed  Not in any acute distress    HEENT- RANDI, EOM intact  Neck- Supple, No JVD  CVS- regular, S1 and S2 normal   Chest- Bilateral Air entry, No rhochi, crackles or wheezing present  Abdomen- soft, nontender, not distended, no guarding or rigidity, BS+  Extremities-  No pedal edema, No calf tenderness                         Normal ROM in all extremities  CNS-   Alert, awake and orientedx3  No focal deficits present  Invasive Devices     Peripheral Intravenous Line            Peripheral IV 10/27/17 Right Antecubital 3 days                      Social History  reviewed  Family History   Problem Relation Age of Onset    Family history unknown:  Yes    reviewed    Meds:  Current Facility-Administered Medications   Medication Dose Route Frequency Provider Last Rate Last Dose    ALPRAZolam Joneen Root) tablet 0 25 mg  0 25 mg Oral BID PRN ALESSIO Pena   0 25 mg at 10/31/17 1133    docusate sodium (COLACE) capsule 100 mg  100 mg Oral BID ALESSIO Pena   100 mg at 10/31/17 0804    DULoxetine (CYMBALTA) delayed release capsule 30 mg  30 mg Oral Daily ALESSIO Pena   30 mg at 10/31/17 0804    enoxaparin (LOVENOX) subcutaneous injection 40 mg  40 mg Subcutaneous Daily Alek Copeland MD   40 mg at 10/31/17 0803    hydrOXYzine HCL (ATARAX) tablet 10 mg  10 mg Oral HS PRN Carmen Crespo MD   10 mg at 10/29/17 2116    influenza inactivated quadrivalent vaccine (FLULAVAL) IM injection 0 5 mL  0 5 mL Intramuscular Prior to discharge Riky Villagomez MD        insulin detemir (LEVEMIR) subcutaneous injection 30 Units  30 Units Subcutaneous HS ALESSIO Pena   30 Units at 10/30/17 2127    insulin lispro (HumaLOG) 100 units/mL subcutaneous injection 2-12 Units  2-12 Units Subcutaneous TID [facility], ALESSIO   6 Units at 10/31/17 1133    insulin lispro (HumaLOG) 100 units/mL subcutaneous injection 2-12 Units  2-12 Units Subcutaneous HS ALESSIO Pena   6 Units at 10/28/17 2245    methadone (DOLOPHINE) tablet 115 mg  115 mg Oral Daily Demetrius Leal Jose Post MD   115 mg at 10/31/17 0803    nicotine (NICODERM CQ) 14 mg/24hr TD 24 hr patch 14 mg  14 mg Transdermal Daily Leonardo Wasserman MD   14 mg at 10/31/17 0806    ondansetron (ZOFRAN) injection 4 mg  4 mg Intravenous Q4H PRN Geradine Ruts, CRNP   4 mg at 10/31/17 0754    pantoprazole (PROTONIX) injection 40 mg  40 mg Intravenous Q24H [address] Leonardo Wasserman MD   40 mg at 10/31/17 0802    pneumococcal 23-valent polysaccharide vaccine (PNEUMOVAX-23) injection 0 5 mL  0 5 mL Subcutaneous Prior to discharge Freya Singer MD        polyethylene glycol (MIRALAX) packet 17 g  17 g Oral BID (diuretic) Geradine Ruts, CRNP   17 g at 10/31/17 0754    propranolol (INDERAL) tablet 20 mg  20 mg Oral Q4H PRN Geradine Ruts, CRNP   20 mg at 10/29/17 2028    senna (SENOKOT) tablet 17 2 mg  2 tablet Oral BID Geradine Ruts, CRNP   17 2 mg at 10/31/17 0804      Prescriptions Prior to Admission   Medication    insulin aspart (NovoLOG) 100 units/mL injection    insulin detemir (LEVEMIR) 100 units/mL subcutaneous injection    methadone (DOLOPHINE) 10 MG/5ML solution    ondansetron (ZOFRAN) 4 mg tablet       Labs:    Results from last 7 days  Lab Units 10/31/17  0420 10/30/17  0435 10/29/17  0517 10/27/17  2048 10/27/17  2035   WBC Thousand/uL 3 63*  --  4 85  --  6 97   HEMOGLOBIN g/dL 15 2  --  14 7  --  16 2   I STAT HEMOGLOBIN g/dl  --   --   --  18 0*  --    HEMATOCRIT % 44 3  --  42 4  --  47 2   PLATELETS Thousands/uL 138* 125* 126*  --  129*   NEUTROS PCT % 58  --  52  --  82*   LYMPHS PCT % 29  --  39  --  12*   MONOS PCT % 10  --  8  --  5   EOS PCT % 2  --  1  --  1       Results from last 7 days  Lab Units 10/31/17  0420 10/30/17  0435 10/29/17  0517  10/27/17  2035   SODIUM mmol/L 135* 135* 137  < > 130*   POTASSIUM mmol/L 4 2 3 6 3 3*  < > 5 0   CHLORIDE mmol/L 97* 99* 99*  < > 86*   CO2 mmol/L 31 26 28  < > 15*   BUN mg/dL 11 8 8  < > 14   CREATININE mg/dL 0 69 0 60 0 67  < > 0 92   CALCIUM mg/dL 9 0 9 3 8 7  < > 9 2 TOTAL PROTEIN g/dL 7 2  --   --   --  8 7*   BILIRUBIN TOTAL mg/dL 1 00  --   --   --  1 60*   ALK PHOS U/L 107  --   --   --  127*   ALT U/L 395*  --   --   --  178*   AST U/L 437*  --   --   --  149*   GLUCOSE RANDOM mg/dL 281* 156* 150*  < > 356*   GLUCOSE, ISTAT   --   --   --   < >  --    < > = values in this interval not displayed  No results found for: Kenton Duff    Results from last 7 days  Lab Units 10/27/17  2035   INR  1 05     No results found for: Tye Bernal SPUTUMCULTUR      Imaging:  No results found for this or any previous visit  No results found for this or any previous visit  Labs & Imaging: I have personally reviewed pertinent reports        VTE Pharmacologic Prophylaxis: Enoxaparin (Lovenox)  VTE Mechanical Prophylaxis: sequential compression device    Code Status:   Prior      "This note has been constructed using a voice recognition system"      Noemy Beth MD  10/31/2017,11:55 AM

## 2017-10-31 NOTE — CASE MANAGEMENT
Continued Stay Review    Date: 10/31/2017     Vital Signs: /83   Pulse 74   Temp 98 1 °F (36 7 °C) (Oral)   Resp 18   Ht 6' 4" (1 93 m)   Wt 89 5 kg (197 lb 5 oz)   SpO2 98%   BMI 24 02 kg/m²     Medications:   Scheduled Meds:   bisacodyl 10 mg Rectal Once   docusate sodium 100 mg Oral BID   DULoxetine 30 mg Oral Daily   enoxaparin 40 mg Subcutaneous Daily   insulin detemir 30 Units Subcutaneous HS   insulin lispro 2-12 Units Subcutaneous TID AC   insulin lispro 2-12 Units Subcutaneous HS   methadone 115 mg Oral Daily   nicotine 14 mg Transdermal Daily   pantoprazole 40 mg Intravenous Q24H ANDREA   polyethylene glycol 17 g Oral BID (diuretic)   senna 2 tablet Oral BID     Continuous Infusions:    PRN Meds:   ALPRAZolam - used x 2      hydrOXYzine HCL    influenza vaccine    Ondansetron 4 mg iv - used x 1    pneumococcal 23-valent polysaccharide vaccine    propranolol    Abnormal Labs/Diagnostic Results:   Serial glucose 211; 259  Wbc 3 63  Na 135  Cl 97  Glucose 281  ast 437  Alt 395  Age/Sex: 46 y o  male   Assessment/Plan: Diabetic ketoacidosis-resolved  · Type 1 diabetes mellitus  Continue on Levemir  Accu-Chek a c  HS with Humalog sliding scale  · Recurrent nausea and vomiting possibly related to DKA versus gastroparesis  EGD showed some gastritis  Abdominal ultrasound  Follow up LFTs  Follow-up serologies  Advance diet as tolerated  GI follow-up  · History of opioid abuse- on methadone  Depression-started on Cymbalta    Per GI- Abnormal LFTs/nausea and vomiting:  Bile gastritis noted on EGD  Curious that LFTs are increasing, not really consistent with a fatty liver seen on ultrasound  No noted gallstones or dilated ducts  Question medication effect  Rule out viral infection  Rule out biliary dyskinesia  Constipation:  Suspect related to methadone  Continue with laxatives       Discharge Plan: home       [facility] in the [facility] [address] for 2017  Network Utilization Review Department  Phone: [phone]; Fax [phone]  ATTENTION: The Network Utilization Review Department is now centralized for our 7 Facilities  Make a note that we have a new phone and fax numbers for our Department  Please call with any questions or concerns to [phone] and carefully follow the prompts so that you are directed to the right person  All voicemails are confidential  Fax any determinations, approvals, denials, and requests for initial or continue stay review clinical to [phone]  Due to HIGH CALL volume, it would be easier if you could please send faxed requests to expedite your requests and in part, help us provide discharge notifications faster

## 2017-10-31 NOTE — CASE MANAGEMENT
CMP 10/27 2035 - na 130, cl 86  Co2-15  Anion gap 29  Glucose 356  ast 149  Alt 178  Alkaline phosphatase 127  Total protein 8 7  Albumin 5 1  Total bilirubin 1 60  Chem 8 at 2048- I stat - na 130, K 6 2  Cl 95  Co2-18  Anion gap 24  Glucose 358    10/28 0024- na 133, cl 94  Co2-15  Anion gap 24  Glucose 284     10/28 0430- na 133, cl 97  Co2-16  Anion gap 20  Glucose 251    10/29  0634  Na 131, cl 97  Co2-19  Anion gap 15  Glucose 243    10/28/2017  1228 na 134  Glucose 186  Fingerstick glucose -  0213- 222;  0338- 227; 0434 - 252; 0530- 250; 0639- 228   0755- 166; 0833- 161; 0929- 208; 1025- 245; 1125- 212; 1241 196; 1633 - 241; 3946- 527      Continued Stay Review    Date: 10/30    Vital Signs: /83   Pulse 74   Temp 98 1 °F (36 7 °C) (Oral)   Resp 18   Ht 6' 4" (1 93 m)   Wt 89 5 kg (197 lb 5 oz)   SpO2 98%   BMI 24 02 kg/m²     Medications:   Scheduled Meds:   docusate sodium 100 mg Oral BID   DULoxetine 30 mg Oral Daily   enoxaparin 40 mg Subcutaneous Daily   insulin detemir 30 Units Subcutaneous HS   insulin lispro 2-12 Units Subcutaneous TID AC   insulin lispro 2-12 Units Subcutaneous HS   methadone 115 mg Oral Daily   nicotine 14 mg Transdermal Daily   pantoprazole 40 mg Intravenous Q24H ANDREA   polyethylene glycol 17 g Oral BID (diuretic)   senna 2 tablet Oral BID     Continuous Infusions:    PRN Meds:   ALPRAZolam - used x 2      hydrOXYzine HCL      Ondansetron 4 mg iv - used x 1    pneumococcal 23-valent polysaccharide vaccine    propranolol    Abnormal Labs/Diagnostic Results:   Serial glucose 103; 175; 221; 294; 264    Age/Sex: 46 y o  male     Assessment/Plan:   Procedure 10/30/2017- EGD-  Normal duodenum  Two biopsies taken  Gastritis found in the   antrum and body of the stomach, bile reflux  Two biopsies taken  Normal   esophagus  · Diabetic ketoacidosis-resolved  · Type 1 diabetes mellitus  Continue on Levemir    Accu-Chek a c  HS with Humalog sliding scale  · Recurrent nausea and vomiting possibly related to DKA versus gastroparesis  Patient is scheduled for EGD today  Abdominal ultrasound  Follow up LFTs  · History of opioid abuse- on methadone  · Depression-started on Cymbalta  DVT prophylaxis    Discharge Plan: to be determined  [facility] in the [facility] by [place] [place] for 2017  Network Utilization Review Department  Phone: [phone]; Fax [phone]  ATTENTION: The Network Utilization Review Department is now centralized for our 7 Facilities  Make a note that we have a new phone and fax numbers for our Department  Please call with any questions or concerns to [phone] and carefully follow the prompts so that you are directed to the right person  All voicemails are confidential  Fax any determinations, approvals, denials, and requests for initial or continue stay review clinical to [phone]  Due to HIGH CALL volume, it would be easier if you could please send faxed requests to expedite your requests and in part, help us provide discharge notifications faster

## 2017-10-31 NOTE — PROGRESS NOTES
Claudetta Murders  MR # [id number]  Unit/Bed#: [address]  46 y o   male      ASSESSMENT:  1  Recurrent nausea and vomiting  EGD obtained 10/30 showed normal mucosa of the esophagus and duodenum, + gastritis, bile reflux  Nausea is improving and is relieved with Zofran  He is tolerating meals  2   Constipation-  constipation is likely due to methadone  Zofran also can contribute  3  Increased LFT with hepatomegaly on Ct  Suspect fatty liver  However, transaminase are even more elevated today then on admission  Patient with some epigastric discomfort  Will need to rule out biliary dyskinsia  Still awaiting hepatic serologies  PLAN:  1  Continue PI daily and Zofran as needed  2  Continue bowel regimen; encourage ambulation  3  Added CMV and EBV serology  4  Ordered HIDA scan  Chief Complaint   Patient presents with    Vomiting     Presents with C/O vomiting x 3 weeks, no bowel movement x 2 weeks  Ran out of insulin yesterday  SUBJECTIVE/HP:  Awake in bed  States some nausea this morning that was controlled with Zofran  Tolerating meals without vomiting  Still with constipation    /83   Pulse 74   Temp 98 1 °F (36 7 °C) (Oral)   Resp 18   Ht 6' 4" (1 93 m)   Wt 89 5 kg (197 lb 5 oz)   SpO2 98%   BMI 24 02 kg/m²     PHYSICALEXAM  General appearance: alert, appears stated age and cooperative,    Head: Normocephalic, without obvious abnormality, atraumatic  Lungs: CTA  Heart: regular rate and rhythm, S1,S2, no murmur,gallop or rub  Abdomen: soft, mildly tender on palpation of epigastric area; bowel sounds normal; no masses,  no organomegaly  Extremities: +0 edema BL   Neurologic: awake and alert, nonfocal     Lab Results   Component Value Date    GLUCOSE 281 (H) 10/31/2017    CALCIUM 9 0 10/31/2017     (L) 10/31/2017    K 4 2 10/31/2017    CO2 31 10/31/2017    CL 97 (L) 10/31/2017    BUN 11 10/31/2017    CREATININE 0 69 10/31/2017     Lab Results   Component Value Date    WBC 3 63 (L) 10/31/2017    HGB 15 2 10/31/2017    HCT 44 3 10/31/2017    MCV 93 10/31/2017     (L) 10/31/2017     Lab Results   Component Value Date     (H) 10/31/2017     (H) 10/31/2017    ALKPHOS 107 10/31/2017    BILITOT 1 00 10/31/2017     No results found for: AMYLASE  Lab Results   Component Value Date    LIPASE 68 (L) 10/27/2017     Lab Results   Component Value Date    IRON 79 10/30/2017    TIBC 300 10/30/2017    FERRITIN 505 (H) 10/30/2017     Lab Results   Component Value Date    INR 1 05 10/27/2017   Josh Owens  MR # [id number]  Unit/Bed#: [address] [place] [address]  46 y o   male

## 2017-10-31 NOTE — PLAN OF CARE
Problem: Potential for Falls  Goal: Patient will remain free of falls  INTERVENTIONS:  - Assess patient frequently for physical needs  -  Identify cognitive and physical deficits and behaviors that affect risk of falls  -  [place] fall precautions as indicated by assessment   - Educate patient/family on patient safety including physical limitations  - Instruct patient to call for assistance with activity based on assessment  - Modify environment to reduce risk of injury  - Consider OT/PT consult to assist with strengthening/mobility   Outcome: Progressing      Problem: Nutrition/Hydration-ADULT  Goal: Nutrient/Hydration intake appropriate for improving, restoring or maintaining nutritional needs  Monitor and assess patient's nutrition/hydration status for malnutrition (ex- brittle hair, bruises, dry skin, pale skin and conjunctiva, muscle wasting, smooth red tongue, and disorientation)  Collaborate with interdisciplinary team and initiate plan and interventions as ordered  Monitor patient's weight and dietary intake as ordered or per policy  Utilize nutrition screening tool and intervene per policy  Determine patient's food preferences and provide high-protein, high-caloric foods as appropriate       INTERVENTIONS:  - Monitor oral intake, urinary output, labs, and treatment plans  - Assess nutrition and hydration status and recommend course of action  - Evaluate amount of meals eaten  - Assist patient with eating if necessary   - Allow adequate time for meals  - Recommend/ encourage appropriate diets, oral nutritional supplements, and vitamin/mineral supplements  - Order, calculate, and assess calorie counts as needed  - Recommend, monitor, and adjust tube feedings and TPN/PPN based on assessed needs  - Assess need for intravenous fluids  - Provide specific nutrition/hydration education as appropriate  - Include patient/family/caregiver in decisions related to nutrition   Outcome: Progressing      Problem: PAIN - ADULT  Goal: Verbalizes/displays adequate comfort level or baseline comfort level  Interventions:  - Encourage patient to monitor pain and request assistance  - Assess pain using appropriate pain scale  - Administer analgesics based on type and severity of pain and evaluate response  - Implement non-pharmacological measures as appropriate and evaluate response  - Consider cultural and social influences on pain and pain management  - Notify physician/advanced practitioner if interventions unsuccessful or patient reports new pain   Outcome: Progressing      Problem: INFECTION - ADULT  Goal: Absence or prevention of progression during hospitalization  INTERVENTIONS:  - Assess and monitor for signs and symptoms of infection  - Monitor lab/diagnostic results  - Monitor all insertion sites, i e  indwelling lines, tubes, and drain  - [place] appropriate cooling/warming therapies per order  - Administer medications as ordered  - Instruct and encourage patient and family to use good hand hygiene technique  - Identify and instruct in appropriate isolation precautions for identified infection/condition   Outcome: Progressing      Problem: SAFETY ADULT  Goal: Maintain or return to baseline ADL function  INTERVENTIONS:  -  Assess patient's ability to carry out ADLs; assess patient's baseline for ADL function and identify physical deficits which impact ability to perform ADLs (bathing, care of mouth/teeth, toileting, grooming, dressing, etc )  - Assess/evaluate cause of self-care deficits   - Assess range of motion  - Assess patient's mobility; develop plan if impaired  - Assess patient's need for assistive devices and provide as appropriate  - Encourage maximum independence but intervene and supervise when necessary  ¯ Involve family in performance of ADLs  ¯ Assess for home care needs following discharge   ¯ Request OT consult to assist with ADL evaluation and planning for discharge  ¯ Provide patient education as appropriate Outcome: Progressing    Goal: Maintain or return mobility status to optimal level  INTERVENTIONS:  - Assess patient's baseline mobility status (ambulation, transfers, stairs, etc )    - Identify cognitive and physical deficits and behaviors that affect mobility  - Identify mobility aids required to assist with transfers and/or ambulation (gait belt, sit-to-stand, lift, walker, cane, etc )  - [place] fall precautions as indicated by assessment  - Record patient progress and toleration of activity level on Mobility SBAR; progress patient to next Phase/Stage  - Instruct patient to call for assistance with activity based on assessment  - Request Rehabilitation consult to assist with strengthening/weightbearing, etc    Outcome: Progressing      Problem: DISCHARGE PLANNING  Goal: Discharge to home or other facility with appropriate resources  INTERVENTIONS:  - Identify barriers to discharge w/patient and caregiver  - Arrange for needed discharge resources and transportation as appropriate  - Identify discharge learning needs (meds, wound care, etc )  - Refer to Case Management Department for coordinating discharge planning if the patient needs post-hospital services based on physician/advanced practitioner order or complex needs related to functional status, cognitive ability, or social support system   Outcome: Progressing      Problem: Knowledge Deficit  Goal: Patient/family/caregiver demonstrates understanding of disease process, treatment plan, medications, and discharge instructions  Complete learning assessment and assess knowledge base    Interventions:  - Provide teaching at level of understanding  - Provide teaching via preferred learning methods   Outcome: Progressing      Problem: CARDIOVASCULAR - ADULT  Goal: Maintains optimal cardiac output and hemodynamic stability  INTERVENTIONS:  - Monitor I/O, vital signs and rhythm  - Monitor for S/S and trends of decreased cardiac output i e  bleeding, hypotension  - Administer and titrate ordered vasoactive medications to optimize hemodynamic stability  - Assess quality of pulses, skin color and temperature  - Assess for signs of decreased coronary artery perfusion - ex   Angina  - Instruct patient to report change in severity of symptoms   Outcome: Progressing    Goal: Absence of cardiac dysrhythmias or at baseline rhythm  INTERVENTIONS:  - Continuous cardiac monitoring, monitor vital signs, obtain 12 lead EKG if indicated  - Administer antiarrhythmic and heart rate control medications as ordered  - Monitor electrolytes and administer replacement therapy as ordered   Outcome: Progressing      Problem: GASTROINTESTINAL - ADULT  Goal: Minimal or absence of nausea and/or vomiting  INTERVENTIONS:  - Administer IV fluids as ordered to ensure adequate hydration  - Maintain NPO status until nausea and vomiting are resolved  - Administer ordered antiemetic medications as needed  - Provide nonpharmacologic comfort measures as appropriate  - Advance diet as tolerated, if ordered  - Nutrition services referral to assist patient with adequate nutrition and appropriate food choices   Outcome: Progressing    Goal: Maintains or returns to baseline bowel function  INTERVENTIONS:  - Assess bowel function  - Encourage oral fluids to ensure adequate hydration  - Administer IV fluids as ordered to ensure adequate hydration  - Administer ordered medications as needed  - Encourage mobilization and activity  - Nutrition services referral to assist patient with appropriate food choices   Outcome: Progressing    Goal: Maintains adequate nutritional intake  INTERVENTIONS:  - Monitor percentage of each meal consumed  - Identify factors contributing to decreased intake, treat as appropriate  - Assist with meals as needed  - Monitor I&O, WT and lab values  - Obtain nutrition services referral as needed   Outcome: Progressing      Problem: METABOLIC, FLUID AND ELECTROLYTES - ADULT  Goal: Electrolytes maintained within normal limits  INTERVENTIONS:  - Monitor labs and assess patient for signs and symptoms of electrolyte imbalances  - Administer electrolyte replacement as ordered  - Monitor response to electrolyte replacements, including repeat lab results as appropriate  - Instruct patient on fluid and nutrition as appropriate   Outcome: Progressing    Goal: Fluid balance maintained  INTERVENTIONS:  - Monitor labs and assess for signs and symptoms of volume excess or deficit  - Monitor I/O and WT  - Instruct patient on fluid and nutrition as appropriate   Outcome: Progressing    Goal: Glucose maintained within target range  INTERVENTIONS:  - Monitor Blood Glucose as ordered  - Assess for signs and symptoms of hyperglycemia and hypoglycemia  - Administer ordered medications to maintain glucose within target range  - Assess nutritional intake and initiate nutrition service referral as needed   Outcome: Progressing

## 2017-11-01 ENCOUNTER — APPOINTMENT (INPATIENT)
Dept: NUCLEAR MEDICINE | Facility: HOSPITAL | Age: 42
DRG: 249 | End: 2017-11-01
Payer: COMMERCIAL

## 2017-11-01 LAB
ALBUMIN SERPL BCP-MCNC: 3.8 G/DL (ref 3.5–5)
ALP SERPL-CCNC: 109 U/L (ref 46–116)
ALT SERPL W P-5'-P-CCNC: 418 U/L (ref 12–78)
ANION GAP SERPL CALCULATED.3IONS-SCNC: 10 MMOL/L (ref 4–13)
AST SERPL W P-5'-P-CCNC: 304 U/L (ref 5–45)
BASOPHILS # BLD AUTO: 0.03 THOUSANDS/ΜL (ref 0–0.1)
BASOPHILS NFR BLD AUTO: 1 % (ref 0–1)
BILIRUB SERPL-MCNC: 0.6 MG/DL (ref 0.2–1)
BUN SERPL-MCNC: 10 MG/DL (ref 5–25)
CALCIUM SERPL-MCNC: 9.2 MG/DL (ref 8.3–10.1)
CHLORIDE SERPL-SCNC: 100 MMOL/L (ref 100–108)
CMV IGG SERPL IA-ACNC: <0.6 U/ML (ref 0–0.59)
CMV IGM SERPL IA-ACNC: <30 AU/ML (ref 0–29.9)
CO2 SERPL-SCNC: 31 MMOL/L (ref 21–32)
CREAT SERPL-MCNC: 0.81 MG/DL (ref 0.6–1.3)
EBV EA IGG SER-ACNC: <9 U/ML (ref 0–8.9)
EBV NA IGG SER IA-ACNC: 306 U/ML (ref 0–17.9)
EBV PATRN SPEC IB-IMP: ABNORMAL
EBV VCA IGG SER IA-ACNC: 536 U/ML (ref 0–17.9)
EBV VCA IGM SER IA-ACNC: <36 U/ML (ref 0–35.9)
EOSINOPHIL # BLD AUTO: 0.11 THOUSAND/ΜL (ref 0–0.61)
EOSINOPHIL NFR BLD AUTO: 2 % (ref 0–6)
ERYTHROCYTE [DISTWIDTH] IN BLOOD BY AUTOMATED COUNT: 13.8 % (ref 11.6–15.1)
GFR SERPL CREATININE-BSD FRML MDRD: 112 ML/MIN/1.73SQ M
GLUCOSE SERPL-MCNC: 131 MG/DL (ref 65–140)
GLUCOSE SERPL-MCNC: 144 MG/DL (ref 65–140)
GLUCOSE SERPL-MCNC: 169 MG/DL (ref 65–140)
GLUCOSE SERPL-MCNC: 243 MG/DL (ref 65–140)
GLUCOSE SERPL-MCNC: 309 MG/DL (ref 65–140)
GLUCOSE SERPL-MCNC: 323 MG/DL (ref 65–140)
HCT VFR BLD AUTO: 44.4 % (ref 36.5–49.3)
HGB BLD-MCNC: 15.2 G/DL (ref 12–17)
LYMPHOCYTES # BLD AUTO: 1.69 THOUSANDS/ΜL (ref 0.6–4.47)
LYMPHOCYTES NFR BLD AUTO: 37 % (ref 14–44)
MCH RBC QN AUTO: 31.7 PG (ref 26.8–34.3)
MCHC RBC AUTO-ENTMCNC: 34.2 G/DL (ref 31.4–37.4)
MCV RBC AUTO: 93 FL (ref 82–98)
MONOCYTES # BLD AUTO: 0.52 THOUSAND/ΜL (ref 0.17–1.22)
MONOCYTES NFR BLD AUTO: 11 % (ref 4–12)
NEUTROPHILS # BLD AUTO: 2.25 THOUSANDS/ΜL (ref 1.85–7.62)
NEUTS SEG NFR BLD AUTO: 49 % (ref 43–75)
PLATELET # BLD AUTO: 155 THOUSANDS/UL (ref 149–390)
PMV BLD AUTO: 10.7 FL (ref 8.9–12.7)
POTASSIUM SERPL-SCNC: 3.8 MMOL/L (ref 3.5–5.3)
PROT SERPL-MCNC: 7.2 G/DL (ref 6.4–8.2)
RBC # BLD AUTO: 4.8 MILLION/UL (ref 3.88–5.62)
RYE IGE QN: NEGATIVE
SODIUM SERPL-SCNC: 141 MMOL/L (ref 136–145)
WBC # BLD AUTO: 4.6 THOUSAND/UL (ref 4.31–10.16)

## 2017-11-01 PROCEDURE — C9113 INJ PANTOPRAZOLE SODIUM, VIA: HCPCS | Performed by: INTERNAL MEDICINE

## 2017-11-01 PROCEDURE — 80053 COMPREHEN METABOLIC PANEL: CPT | Performed by: INTERNAL MEDICINE

## 2017-11-01 PROCEDURE — 78227 HEPATOBIL SYST IMAGE W/DRUG: CPT

## 2017-11-01 PROCEDURE — 85025 COMPLETE CBC W/AUTO DIFF WBC: CPT | Performed by: INTERNAL MEDICINE

## 2017-11-01 PROCEDURE — A9537 TC99M MEBROFENIN: HCPCS

## 2017-11-01 PROCEDURE — 82948 REAGENT STRIP/BLOOD GLUCOSE: CPT

## 2017-11-01 RX ADMIN — SENNOSIDES 17.2 MG: 8.6 TABLET, FILM COATED ORAL at 14:12

## 2017-11-01 RX ADMIN — SINCALIDE 1.8 MCG: 5 INJECTION, POWDER, LYOPHILIZED, FOR SOLUTION INTRAVENOUS at 12:51

## 2017-11-01 RX ADMIN — DEXTROSE AND SODIUM CHLORIDE 50 ML/HR: 5; 450 INJECTION, SOLUTION INTRAVENOUS at 00:04

## 2017-11-01 RX ADMIN — ALPRAZOLAM 0.25 MG: 0.25 TABLET ORAL at 14:51

## 2017-11-01 RX ADMIN — DOCUSATE SODIUM 100 MG: 100 CAPSULE, LIQUID FILLED ORAL at 21:16

## 2017-11-01 RX ADMIN — DULOXETINE 30 MG: 30 CAPSULE, DELAYED RELEASE ORAL at 14:17

## 2017-11-01 RX ADMIN — INSULIN DETEMIR 30 UNITS: 100 INJECTION, SOLUTION SUBCUTANEOUS at 21:17

## 2017-11-01 RX ADMIN — INSULIN LISPRO 5 UNITS: 100 INJECTION, SOLUTION INTRAVENOUS; SUBCUTANEOUS at 18:09

## 2017-11-01 RX ADMIN — ENOXAPARIN SODIUM 40 MG: 40 INJECTION SUBCUTANEOUS at 14:13

## 2017-11-01 RX ADMIN — ALPRAZOLAM 0.25 MG: 0.25 TABLET ORAL at 10:02

## 2017-11-01 RX ADMIN — INSULIN LISPRO 4 UNITS: 100 INJECTION, SOLUTION INTRAVENOUS; SUBCUTANEOUS at 16:52

## 2017-11-01 RX ADMIN — NICOTINE 14 MG: 14 PATCH, EXTENDED RELEASE TRANSDERMAL at 08:47

## 2017-11-01 RX ADMIN — PANTOPRAZOLE SODIUM 40 MG: 40 INJECTION, POWDER, FOR SOLUTION INTRAVENOUS at 08:43

## 2017-11-01 RX ADMIN — INSULIN LISPRO 8 UNITS: 100 INJECTION, SOLUTION INTRAVENOUS; SUBCUTANEOUS at 21:18

## 2017-11-01 RX ADMIN — DOCUSATE SODIUM 100 MG: 100 CAPSULE, LIQUID FILLED ORAL at 14:12

## 2017-11-01 RX ADMIN — METHADONE HYDROCHLORIDE 115 MG: 10 TABLET ORAL at 14:12

## 2017-11-01 RX ADMIN — POLYETHYLENE GLYCOL 3350 17 G: 17 POWDER, FOR SOLUTION ORAL at 14:13

## 2017-11-01 RX ADMIN — SENNOSIDES 17.2 MG: 8.6 TABLET, FILM COATED ORAL at 21:15

## 2017-11-01 NOTE — PROGRESS NOTES
Mahnaz Rouse  MR # [id number]  Unit/Bed#: [address]  46 y o   male      ASSESSMENT:  1  Recurrent nausea and vomiting  EGD obtained 10/30 showed normal mucosa of the esophagus and duodenum, + gastritis, bile reflux  H Pylori negative  Nausea persists each morning  Patient with vomiting this morning prior to meal   Zofran and PPI control nausea and vomiting  2   Constipation-  constipation is likely due to methadone  Zofran also can contribute  + relief yesterday following suppository  3  Increased LFT with hepatomegaly on Ct  Patient with fatty liver, but would not be increasing his LFTs as seen yesterday  Enzymes slightly imprioved today  EBV and CMV both negative  Possibel medicaiton effect  Will need to rule out biliary dyskinsia with HIDA scan today  Still awaiting hepatic serologies  PLAN:  1  Continue PPI daily and Zofran as needed  2  Continue bowel regimen; encourage ambulation  3  HIDA scan  Chief Complaint   Patient presents with    Vomiting     Presents with C/O vomiting x 3 weeks, no bowel movement x 2 weeks  Ran out of insulin yesterday  SUBJECTIVE/HP:  Patient awake in bed awaiting HIDA scan today  He is now NPO for test  Tolerated meals yesterday  Some nausea and vomiting this morning     /71   Pulse 74   Temp 98 1 °F (36 7 °C) (Oral)   Resp 18   Ht 6' 4" (1 93 m)   Wt 89 5 kg (197 lb 5 oz)   SpO2 98%   BMI 24 02 kg/m²     PHYSICALEXAM  General appearance: alert, appears stated age and cooperative,    Head: Normocephalic, without obvious abnormality, atraumatic  Lungs: CTA  Heart: regular rate and rhythm, S1,S2, no murmur,gallop or rub  Abdomen: soft, non-tender; bowel sounds normal; no masses,  no organomegaly  Extremities: +0 edema BL   Neurologic: awake and alert, nonfocal     Lab Results   Component Value Date    GLUCOSE 144 (H) 11/01/2017    CALCIUM 9 2 11/01/2017     11/01/2017    K 3 8 11/01/2017    CO2 31 11/01/2017     11/01/2017 BUN 10 11/01/2017    CREATININE 0 81 11/01/2017     Lab Results   Component Value Date    WBC 4 60 11/01/2017    HGB 15 2 11/01/2017    HCT 44 4 11/01/2017    MCV 93 11/01/2017     11/01/2017     Lab Results   Component Value Date     (H) 11/01/2017     (H) 11/01/2017    ALKPHOS 109 11/01/2017    BILITOT 0 60 11/01/2017     No results found for: AMYLASE  Lab Results   Component Value Date    LIPASE 68 (L) 10/27/2017     Lab Results   Component Value Date    IRON 79 10/30/2017    TIBC 300 10/30/2017    FERRITIN 505 (H) 10/30/2017     Lab Results   Component Value Date    INR 1 05 10/27/2017   Chinedu Cid  MR # [id number]  Unit/Bed#: [address] [place], [place] [address]  46 y o   male

## 2017-11-01 NOTE — PROGRESS NOTES
Progress Note - Dorcas Caicedo 46 y o  male MRN: [id number]  Unit/Bed#: [address] [place] [address] 208-02 Encounter: [id number]    Assessment:  Principal Problem:    Diabetic ketoacidosis without coma associated with type 1 diabetes mellitus ([address] )  Active Problems:    Type 1 diabetes mellitus (HCC)    Colitis    Vomiting, persistent, in adult    Hyperglycemia    Constipation  Resolved Problems:    * No resolved hospital problems  *      Plan:  · Recurrent nausea and vomiting possibly related to DKA versus gastroparesis  · Transaminitis  EGD showed some gastritis  Abdominal ultrasound results noted  GI follow-up noted  GI ordered HIDA scan  Follow up LFTs  Follow-up serologies  Advance diet as tolerated  · Diabetic ketoacidosis-resolved  · Type 1 diabetes mellitus  Continue on Levemir  Accu-Chek a c  HS with Humalog sliding scale  · History of opioid abuse- on methadone  Continue laxatives  · Depression-started on Cymbalta  DVT prophylaxis  Discussed with patient  Subjective:   Patient is seen and examined at bedside  Patient had an episode of vomiting this morning  Denies any abdominal pain  Afebrile  No other complaints  All other ROS are negative  Objective:   Vitals: Blood pressure 130/81, pulse 66, temperature 98 4 °F (36 9 °C), temperature source Oral, resp  rate 18, height 6' 4" (1 93 m), weight 89 5 kg (197 lb 5 oz), SpO2 99 %  ,Body mass index is 24 02 kg/m²  SPO2 RA Rest    Flowsheet Row ED to Hosp-Admission (Current) from 10/27/2017 in [facility]   SpO2  99 %   SpO2 Activity  At Rest   O2 Device  None (Room air)   O2 Flow Rate  No data        I&O:   Intake/Output Summary (Last 24 hours) at 11/01/17 0740  Last data filed at 11/01/17 0608   Gross per 24 hour   Intake                0 ml   Output             1000 ml   Net            -1000 ml       Physical Exam:    General- Alert, lying comfortably in bed  Not in any acute distress  HEENT- RANDI, EOM intact    Neck- Supple, No JVD  CVS- regular, S1 and S2 normal   Chest- Bilateral Air entry, No rhochi, crackles or wheezing present  Abdomen- soft, nontender, not distended, no guarding or rigidity, BS+  Extremities-  No pedal edema, No calf tenderness                         Normal ROM in all extremities  CNS-   Alert, awake and orientedx3  No focal deficits present  Invasive Devices     Peripheral Intravenous Line            Peripheral IV 10/31/17 Left Forearm less than 1 day                      Social History  reviewed  Family History   Problem Relation Age of Onset    Family history unknown:  Yes    reviewed    Meds:  Current Facility-Administered Medications   Medication Dose Route Frequency Provider Last Rate Last Dose    ALPRAZolam Yasmin Remy) tablet 0 25 mg  0 25 mg Oral BID PRN ALESSIO Mcgowan   0 25 mg at 10/31/17 2118    dextrose 5 % and sodium chloride 0 45 % infusion  50 mL/hr Intravenous Continuous Richie Batista MD 50 mL/hr at 11/01/17 0004 50 mL/hr at 11/01/17 0004    docusate sodium (COLACE) capsule 100 mg  100 mg Oral BID ALESSIO Mcgowan   100 mg at 10/31/17 1701    DULoxetine (CYMBALTA) delayed release capsule 30 mg  30 mg Oral Daily ALESSIO Mcgowan   30 mg at 10/31/17 0804    enoxaparin (LOVENOX) subcutaneous injection 40 mg  40 mg Subcutaneous Daily Alissa Trujillo MD   40 mg at 10/31/17 0803    hydrOXYzine HCL (ATARAX) tablet 10 mg  10 mg Oral HS PRN Karen Morales MD   10 mg at 10/29/17 2116    influenza inactivated quadrivalent vaccine (FLULAVAL) IM injection 0 5 mL  0 5 mL Intramuscular Prior to discharge Aimee Brush MD        insulin detemir (LEVEMIR) subcutaneous injection 30 Units  30 Units Subcutaneous HS ALESSIO Mcgowan   30 Units at 10/31/17 2127    insulin lispro (HumaLOG) 100 units/mL subcutaneous injection 2-12 Units  2-12 Units Subcutaneous [facility]ID [facility]   10 Units at 10/31/17 1721    insulin lispro (HumaLOG) 100 units/mL subcutaneous injection 2-12 Units  2-12 Units Subcutaneous HS ALESSIO Nelson   10 Units at 10/31/17 2128    methadone (DOLOPHINE) tablet 115 mg  115 mg Oral Daily Rufus Caicedo MD   115 mg at 10/31/17 0803    nicotine (NICODERM CQ) 14 mg/24hr TD 24 hr patch 14 mg  14 mg Transdermal Daily Rufus Caicedo MD   14 mg at 10/31/17 0806    ondansetron (ZOFRAN) injection 4 mg  4 mg Intravenous Q4H PRN ALESSIO Nelson   4 mg at 10/31/17 0754    pantoprazole (PROTONIX) injection 40 mg  40 mg Intravenous Q24H [address] Rufus Caicedo MD   40 mg at 10/31/17 0802    pneumococcal 23-valent polysaccharide vaccine (PNEUMOVAX-23) injection 0 5 mL  0 5 mL Subcutaneous Prior to discharge Zuri Gupta MD        polyethylene glycol (MIRALAX) packet 17 g  17 g Oral BID (diuretic) ALESSIO Nelson   17 g at 10/31/17 1701    propranolol (INDERAL) tablet 20 mg  20 mg Oral Q4H PRN ALESSIO Nelson   20 mg at 10/29/17 2028    senna (SENOKOT) tablet 17 2 mg  2 tablet Oral BID ALESSIO Nelson   17 2 mg at 10/31/17 1701      Prescriptions Prior to Admission   Medication    insulin aspart (NovoLOG) 100 units/mL injection    insulin detemir (LEVEMIR) 100 units/mL subcutaneous injection    methadone (DOLOPHINE) 10 MG/5ML solution    ondansetron (ZOFRAN) 4 mg tablet       Labs:    Results from last 7 days  Lab Units 11/01/17  0527 10/31/17  0420 10/30/17  0435 10/29/17  0517   WBC Thousand/uL 4 60 3 63*  --  4 85   HEMOGLOBIN g/dL 15 2 15 2  --  14 7   HEMATOCRIT % 44 4 44 3  --  42 4   PLATELETS Thousands/uL 155 138* 125* 126*   NEUTROS PCT % 49 58  --  52   LYMPHS PCT % 37 29  --  39   MONOS PCT % 11 10  --  8   EOS PCT % 2 2  --  1       Results from last 7 days  Lab Units 11/01/17  0527 10/31/17  0420 10/30/17  0435  10/27/17  2035   SODIUM mmol/L 141 135* 135*  < > 130*   POTASSIUM mmol/L 3 8 4 2 3 6  < > 5 0   CHLORIDE mmol/L 100 97* 99*  < > 86*   CO2 mmol/L 31 31 26  < > 15*   BUN mg/dL 10 11 8  < > 14   CREATININE mg/dL 0 81 0 69 0 60  < > 0 92   CALCIUM mg/dL 9 2 9 0 9 3  < > 9 2   TOTAL PROTEIN g/dL 7 2 7 2  --   --  8 7*   BILIRUBIN TOTAL mg/dL 0 60 1 00  --   --  1 60*   ALK PHOS U/L 109 107  --   --  127*   ALT U/L 418* 395*  --   --  178*   AST U/L 304* 437*  --   --  149*   GLUCOSE RANDOM mg/dL 144* 281* 156*  < > 356*   GLUCOSE, ISTAT   --   --   --   < >  --    < > = values in this interval not displayed  No results found for: Gely Bonilla    Results from last 7 days  Lab Units 10/27/17  2035   INR  1 05     No results found for: Raz Gaston SPUTUMSHAWN      Imaging:  No results found for this or any previous visit  No results found for this or any previous visit  Labs & Imaging: I have personally reviewed pertinent reports        VTE Pharmacologic Prophylaxis: Enoxaparin (Lovenox)  VTE Mechanical Prophylaxis: sequential compression device    Code Status:   Prior      "This note has been constructed using a voice recognition system"      Scout Urban MD  11/1/2017,7:40 AM

## 2017-11-02 VITALS
DIASTOLIC BLOOD PRESSURE: 85 MMHG | RESPIRATION RATE: 18 BRPM | WEIGHT: 197.31 LBS | SYSTOLIC BLOOD PRESSURE: 139 MMHG | HEIGHT: 76 IN | OXYGEN SATURATION: 97 % | BODY MASS INDEX: 24.03 KG/M2 | TEMPERATURE: 98 F | HEART RATE: 68 BPM

## 2017-11-02 PROBLEM — K52.9 COLITIS: Status: RESOLVED | Noted: 2017-10-28 | Resolved: 2017-11-02

## 2017-11-02 PROBLEM — R73.9 HYPERGLYCEMIA: Status: RESOLVED | Noted: 2017-10-28 | Resolved: 2017-11-02

## 2017-11-02 PROBLEM — E10.10 DIABETIC KETOACIDOSIS WITHOUT COMA ASSOCIATED WITH TYPE 1 DIABETES MELLITUS (HCC): Status: RESOLVED | Noted: 2017-10-28 | Resolved: 2017-11-02

## 2017-11-02 PROBLEM — R11.15 VOMITING, PERSISTENT, IN ADULT: Status: RESOLVED | Noted: 2017-10-28 | Resolved: 2017-11-02

## 2017-11-02 LAB
ALBUMIN SERPL BCP-MCNC: 4 G/DL (ref 3.5–5)
ALP SERPL-CCNC: 110 U/L (ref 46–116)
ALT SERPL W P-5'-P-CCNC: 463 U/L (ref 12–78)
ANION GAP SERPL CALCULATED.3IONS-SCNC: 8 MMOL/L (ref 4–13)
AST SERPL W P-5'-P-CCNC: 334 U/L (ref 5–45)
BILIRUB DIRECT SERPL-MCNC: 0.18 MG/DL (ref 0–0.2)
BILIRUB SERPL-MCNC: 0.6 MG/DL (ref 0.2–1)
BUN SERPL-MCNC: 9 MG/DL (ref 5–25)
CALCIUM SERPL-MCNC: 9.3 MG/DL (ref 8.3–10.1)
CHLORIDE SERPL-SCNC: 100 MMOL/L (ref 100–108)
CK SERPL-CCNC: 55 U/L (ref 39–308)
CO2 SERPL-SCNC: 31 MMOL/L (ref 21–32)
CREAT SERPL-MCNC: 0.76 MG/DL (ref 0.6–1.3)
GFR SERPL CREATININE-BSD FRML MDRD: 115 ML/MIN/1.73SQ M
GLUCOSE SERPL-MCNC: 272 MG/DL (ref 65–140)
GLUCOSE SERPL-MCNC: 83 MG/DL (ref 65–140)
GLUCOSE SERPL-MCNC: 96 MG/DL (ref 65–140)
POTASSIUM SERPL-SCNC: 4 MMOL/L (ref 3.5–5.3)
PROT SERPL-MCNC: 7.4 G/DL (ref 6.4–8.2)
SODIUM SERPL-SCNC: 139 MMOL/L (ref 136–145)

## 2017-11-02 PROCEDURE — 90686 IIV4 VACC NO PRSV 0.5 ML IM: CPT | Performed by: INTERNAL MEDICINE

## 2017-11-02 PROCEDURE — 82948 REAGENT STRIP/BLOOD GLUCOSE: CPT

## 2017-11-02 PROCEDURE — 36415 COLL VENOUS BLD VENIPUNCTURE: CPT | Performed by: INTERNAL MEDICINE

## 2017-11-02 PROCEDURE — 90732 PPSV23 VACC 2 YRS+ SUBQ/IM: CPT | Performed by: INTERNAL MEDICINE

## 2017-11-02 PROCEDURE — 80076 HEPATIC FUNCTION PANEL: CPT | Performed by: NURSE PRACTITIONER

## 2017-11-02 PROCEDURE — 82550 ASSAY OF CK (CPK): CPT | Performed by: INTERNAL MEDICINE

## 2017-11-02 PROCEDURE — C9113 INJ PANTOPRAZOLE SODIUM, VIA: HCPCS | Performed by: INTERNAL MEDICINE

## 2017-11-02 PROCEDURE — 80048 BASIC METABOLIC PNL TOTAL CA: CPT | Performed by: INTERNAL MEDICINE

## 2017-11-02 RX ORDER — METOCLOPRAMIDE HYDROCHLORIDE 5 MG/ML
5 INJECTION INTRAMUSCULAR; INTRAVENOUS EVERY 6 HOURS PRN
Status: DISCONTINUED | OUTPATIENT
Start: 2017-11-02 | End: 2017-11-02 | Stop reason: HOSPADM

## 2017-11-02 RX ORDER — DULOXETIN HYDROCHLORIDE 30 MG/1
30 CAPSULE, DELAYED RELEASE ORAL DAILY
Qty: 30 CAPSULE | Refills: 0 | Status: SHIPPED | OUTPATIENT
Start: 2017-11-03 | End: 2017-11-04 | Stop reason: ALTCHOICE

## 2017-11-02 RX ORDER — NICOTINE 21 MG/24HR
1 PATCH, TRANSDERMAL 24 HOURS TRANSDERMAL DAILY
Qty: 28 PATCH | Refills: 0 | Status: SHIPPED | OUTPATIENT
Start: 2017-11-03 | End: 2017-11-04 | Stop reason: ALTCHOICE

## 2017-11-02 RX ORDER — METOCLOPRAMIDE 5 MG/1
5 TABLET ORAL 3 TIMES DAILY PRN
Qty: 90 TABLET | Refills: 0 | Status: SHIPPED | OUTPATIENT
Start: 2017-11-02 | End: 2017-11-04 | Stop reason: ALTCHOICE

## 2017-11-02 RX ORDER — POLYETHYLENE GLYCOL 3350 17 G/17G
17 POWDER, FOR SOLUTION ORAL
Qty: 14 EACH | Refills: 0 | Status: SHIPPED | OUTPATIENT
Start: 2017-11-02 | End: 2017-11-04 | Stop reason: ALTCHOICE

## 2017-11-02 RX ADMIN — PANTOPRAZOLE SODIUM 40 MG: 40 INJECTION, POWDER, FOR SOLUTION INTRAVENOUS at 08:25

## 2017-11-02 RX ADMIN — INSULIN LISPRO 5 UNITS: 100 INJECTION, SOLUTION INTRAVENOUS; SUBCUTANEOUS at 12:13

## 2017-11-02 RX ADMIN — INSULIN LISPRO 6 UNITS: 100 INJECTION, SOLUTION INTRAVENOUS; SUBCUTANEOUS at 12:12

## 2017-11-02 RX ADMIN — POLYETHYLENE GLYCOL 3350 17 G: 17 POWDER, FOR SOLUTION ORAL at 08:32

## 2017-11-02 RX ADMIN — METHADONE HYDROCHLORIDE 115 MG: 10 TABLET ORAL at 08:25

## 2017-11-02 RX ADMIN — INSULIN LISPRO 5 UNITS: 100 INJECTION, SOLUTION INTRAVENOUS; SUBCUTANEOUS at 08:33

## 2017-11-02 RX ADMIN — ALPRAZOLAM 0.25 MG: 0.25 TABLET ORAL at 06:09

## 2017-11-02 RX ADMIN — PNEUMOCOCCAL VACCINE POLYVALENT 0.5 ML
25; 25; 25; 25; 25; 25; 25; 25; 25; 25; 25; 25; 25; 25; 25; 25; 25; 25; 25; 25; 25; 25; 25 INJECTION, SOLUTION INTRAMUSCULAR; SUBCUTANEOUS at 15:17

## 2017-11-02 RX ADMIN — DULOXETINE 30 MG: 30 CAPSULE, DELAYED RELEASE ORAL at 08:27

## 2017-11-02 RX ADMIN — DOCUSATE SODIUM 100 MG: 100 CAPSULE, LIQUID FILLED ORAL at 08:27

## 2017-11-02 RX ADMIN — NICOTINE 14 MG: 14 PATCH, EXTENDED RELEASE TRANSDERMAL at 08:27

## 2017-11-02 RX ADMIN — ALPRAZOLAM 0.25 MG: 0.25 TABLET ORAL at 12:11

## 2017-11-02 RX ADMIN — ENOXAPARIN SODIUM 40 MG: 40 INJECTION SUBCUTANEOUS at 08:25

## 2017-11-02 RX ADMIN — INFLUENZA VIRUS VACCINE 0.5 ML: 15; 15; 15; 15 SUSPENSION INTRAMUSCULAR at 15:17

## 2017-11-02 RX ADMIN — SENNOSIDES 17.2 MG: 8.6 TABLET, FILM COATED ORAL at 08:27

## 2017-11-02 NOTE — DISCHARGE INSTRUCTIONS
Follow-up with PCP in 1 week  Repeat liver function test in 1 week with PCP  Follow-up with Gastroenterology as outpatient in 1-2 weeks  Return to ER with worsening confusion, fever, chills, intractable nausea or vomiting or any other alarming symptoms

## 2017-11-02 NOTE — DISCHARGE SUMMARY
Discharge Summary - Nimisha Prasad 46 y o  male MRN: [id number]  Unit/Bed#: [address] [place] [address] Encounter: [id number]    Admission Date:    10/27/2017   Discharge Date:   11/02/17   Admitting Diagnosis:   Intractable vomiting [R11 10]  Acute colitis [K52 9]  Vomiting in adult [R11 10]  Transaminasemia [R74 0]  Hyperglycemia due to type 1 diabetes mellitus ([address] ) [E10 65]  Admitting Provider:   Karan Rhodes MD  Discharge Provider:   Ector Jain MD     Primary Care Physician at Discharge:   Andrea Juárez IV,[phone]    HPI:   82-year-old male who presented to emergency department with persistent vomiting for 2 weeks along with constipation hyperglycemia  For a detailed HPI please refer to the admission note  Procedures Performed:   Orders Placed This Encounter   Procedures    ED ECG Documentation Only       Hospital Course:   Patient was admitted with diabetic ketoacidosis  He was started on insulin drip and once his anion gap closed he was switched to Levemir  Patient was seen by Gastroenterology for recurrent nausea and vomiting  Patient has history of opiate abuse and is on methadone program   Patient had abdominal ultrasound done which showed hepatomegaly and fatty liver  Patient had endoscopy done which showed gastritis  Patient had serologies, and other studies done for transaminitis  Patient at HIDA scan done which was negative  Patient was started on Reglan and his nausea and vomiting improved  Patient tolerated the diet well  Patient will be discharged home on Reglan  Patient was cleared by GI for discharge and they recommended outpatient follow-up with them  Patient was seen by  and did not require any home care services  Patient was ambulating freely in hallways    Follow-up with PCP in 1 week  Repeat liver function test in 1 week with PCP  Follow-up with Gastroenterology as outpatient in 1-2 weeks  Return to ER with worsening confusion, fever, chills, intractable nausea or vomiting or any other alarming symptoms  Consulting Providers   Gastroenterology    Complications:  None    Labs:   Lab Results   Component Value Date    WBC 4 60 11/01/2017    RBC 4 80 11/01/2017    HGB 15 2 11/01/2017    HCT 44 4 11/01/2017    MCV 93 11/01/2017    MCH 31 7 11/01/2017    RDW 13 8 11/01/2017     11/01/2017     Lab Results   Component Value Date    CREATININE 0 76 11/02/2017    BUN 9 11/02/2017     11/02/2017    K 4 0 11/02/2017     11/02/2017    CO2 31 11/02/2017    GLUCOSE 96 11/02/2017    GLUCOSE 358 (H) 10/27/2017    PROT 7 4 11/02/2017    ALKPHOS 110 11/02/2017     (H) 11/02/2017     (H) 11/02/2017    BILIDIR 0 18 11/02/2017       Treatments:  IV hydration and Insulin drip, Levemir, Humalog sliding scale, Cymbalta, methadone and stool softeners    Discharge Diagnosis:   Principal Problem:    Diabetic ketoacidosis without coma associated with type 1 diabetes mellitus ([facility] [address] )  Active Problems:    Type 1 diabetes mellitus (HCC)    Colitis    Vomiting, persistent, in adult    Hyperglycemia    Constipation  Resolved Problems:    * No resolved hospital problems  *      Condition at Discharge:   Good     Code Status: Prior  Advance Directive and Living Will: <no information>  Power of :    POLST:      Discharge instructions/Information to patient and family:   See after visit summary for information provided to patient and family  Provisions for Follow-Up Care:  See after visit summary for information related to follow-up care and any pertinent home health orders  Disposition:   Home    Planned Readmission:   No    Discharge Statement   I spent 35 minutes discharging the patient  This time was spent on the day of discharge  I had direct contact with the patient on the day of discharge   Greater than 50% of the total time was spent examining patient, answering all patient questions, arranging and discussing plan of care with patient as well as directly providing post-discharge instructions  Additional time then spent on discharge activities  Discharge Medications:  See after visit summary for reconciled discharge medications provided to patient and family        "This note has been constructed using a voice recognition system"    Yara Casas MD  11/2/2017,3:00 PM

## 2017-11-02 NOTE — PROGRESS NOTES
Jeffrey Capulin  [id number]    46 y o   male      ASSESSMENT  1  Recurrent nausea and vomiting  EGD obtained 10/30 showed normal mucosa of the esophagus and duodenum, + gastritis, bile reflux  H Pylori negative  Nausea persists each morning  Patient with vomiting this morning prior to meal   Zofran and PPI control nausea and vomiting  Possible gastroparesis from DM ad meds  2   Constipation-  constipation is likely due to methadone   Zofran also can contribute  Remains constipated  3  Increased LFT with hepatomegaly on Ct   HIDA scan negative  Patient with fatty liver, but would not be increasing his LFTs as seen yesterday  Enzymes slightly improved today  Viral Hepatitis serologies negative thus far  EBV, CM, ceruloplasmin and iron serologies negative  PLAN  1  Check LFT's today  2  Continue Senna, Miralax and Colace  3  Add low dose Reglan    Chief Complaint   Patient presents with    Vomiting     Presents with C/O vomiting x 3 weeks, no bowel movement x 2 weeks  Ran out of insulin yesterday  SUBJECTIVE/HPI   Nausea slightly improved  Vomited yesterday  Ate 100% of breakfast today       /85   Pulse 68   Temp 98 °F (36 7 °C) (Oral)   Resp 18   Ht 6' 4" (1 93 m)   Wt 89 5 kg (197 lb 5 oz)   SpO2 97%   BMI 24 02 kg/m²       PHYSICALEXAM  Constitutional:  Well developed, well nourished, no acute distress, non-toxic appearance   Eyes:  conjunctiva normal   HENT:  Atraumatic, external ears normal, nose normal  Respiratory:  No respiratory distress   Cardiovascular:  Normal rate  GI:  Soft, nondistended, normal bowel sounds, nontender  Musculoskeletal:  No edema  Neurologic:  Alert & oriented x 3,  Psychiatric:  Speech and behavior appropriate       Lab Results   Component Value Date    GLUCOSE 96 11/02/2017    CALCIUM 9 3 11/02/2017     11/02/2017    K 4 0 11/02/2017    CO2 31 11/02/2017     11/02/2017    BUN 9 11/02/2017    CREATININE 0 76 11/02/2017     Lab Results Component Value Date    WBC 4 60 11/01/2017    HGB 15 2 11/01/2017    HCT 44 4 11/01/2017    MCV 93 11/01/2017     11/01/2017     Lab Results   Component Value Date     (H) 11/01/2017     (H) 11/01/2017    ALKPHOS 109 11/01/2017    BILITOT 0 60 11/01/2017     No results found for: AMYLASE  Lab Results   Component Value Date    LIPASE 68 (L) 10/27/2017     Lab Results   Component Value Date    IRON 79 10/30/2017    TIBC 300 10/30/2017    FERRITIN 505 (H) 10/30/2017     Lab Results   Component Value Date    INR 1 05 10/27/2017       Counseling / Coordination of Care  Total floor / unit time spent today 25 minutes  Greater than 50% of total time was spent with the patient and / or family counseling and / or coordination of care  A description of the counseling / coordination of care: Parth Garcia

## 2017-11-04 ENCOUNTER — HOSPITAL ENCOUNTER (INPATIENT)
Facility: HOSPITAL | Age: 42
LOS: 3 days | Discharge: HOME | DRG: 420 | End: 2017-11-07
Attending: INTERNAL MEDICINE | Admitting: INTERNAL MEDICINE
Payer: COMMERCIAL

## 2017-11-04 DIAGNOSIS — K59.00 CONSTIPATION: Primary | ICD-10-CM

## 2017-11-04 DIAGNOSIS — F11.20 NARCOTIC DEPENDENCY, CONTINUOUS (HCC): ICD-10-CM

## 2017-11-04 DIAGNOSIS — E10.10 DIABETIC KETOACIDOSIS WITHOUT COMA ASSOCIATED WITH TYPE 1 DIABETES MELLITUS (HCC): ICD-10-CM

## 2017-11-04 DIAGNOSIS — F41.9 ANXIETY: ICD-10-CM

## 2017-11-04 DIAGNOSIS — E10.11 TYPE 1 DIABETES MELLITUS WITH KETOACIDOTIC COMA (HCC): ICD-10-CM

## 2017-11-04 PROBLEM — K76.0 FATTY LIVER: Status: ACTIVE | Noted: 2017-11-04

## 2017-11-04 LAB
ACETONE SERPL-MCNC: ABNORMAL MG/DL
ALBUMIN SERPL BCP-MCNC: 4.8 G/DL (ref 3.5–5)
ALP SERPL-CCNC: 157 U/L (ref 46–116)
ALT SERPL W P-5'-P-CCNC: 378 U/L (ref 12–78)
ANION GAP BLD CALC-SCNC: 24 MMOL/L (ref 4–13)
ANION GAP SERPL CALCULATED.3IONS-SCNC: 25 MMOL/L (ref 4–13)
ANION GAP SERPL CALCULATED.3IONS-SCNC: 27 MMOL/L (ref 4–13)
ANION GAP SERPL CALCULATED.3IONS-SCNC: 29 MMOL/L (ref 4–13)
APTT PPP: 28 SECONDS (ref 23–35)
ARTERIAL PATENCY WRIST A: ABNORMAL
ARTERIAL PATENCY WRIST A: ABNORMAL
AST SERPL W P-5'-P-CCNC: 155 U/L (ref 5–45)
BACTERIA UR QL AUTO: ABNORMAL /HPF
BASE EXCESS BLDA CALC-SCNC: -22 MMOL/L (ref -2–3)
BASE EXCESS BLDA CALC-SCNC: -22 MMOL/L (ref -2–3)
BASOPHILS # BLD AUTO: 0.06 THOUSANDS/ΜL (ref 0–0.1)
BASOPHILS NFR BLD AUTO: 1 % (ref 0–1)
BILIRUB SERPL-MCNC: 0.9 MG/DL (ref 0.2–1)
BILIRUB UR QL STRIP: NEGATIVE
BUN BLD-MCNC: 12 MG/DL (ref 5–25)
BUN SERPL-MCNC: 13 MG/DL (ref 5–25)
BUN SERPL-MCNC: 14 MG/DL (ref 5–25)
BUN SERPL-MCNC: 14 MG/DL (ref 5–25)
CA-I BLD-SCNC: 1.17 MMOL/L (ref 1.12–1.32)
CALCIUM SERPL-MCNC: 7.8 MG/DL (ref 8.3–10.1)
CALCIUM SERPL-MCNC: 8 MG/DL (ref 8.3–10.1)
CALCIUM SERPL-MCNC: 9.2 MG/DL (ref 8.3–10.1)
CHLORIDE BLD-SCNC: 108 MMOL/L (ref 100–108)
CHLORIDE SERPL-SCNC: 100 MMOL/L (ref 100–108)
CHLORIDE SERPL-SCNC: 93 MMOL/L (ref 100–108)
CHLORIDE SERPL-SCNC: 98 MMOL/L (ref 100–108)
CK MB SERPL-MCNC: 1.1 % (ref 0–2.5)
CK MB SERPL-MCNC: 2.5 NG/ML (ref 0–5)
CK SERPL-CCNC: 229 U/L (ref 39–308)
CLARITY UR: CLEAR
CO2 SERPL-SCNC: 10 MMOL/L (ref 21–32)
CO2 SERPL-SCNC: 12 MMOL/L (ref 21–32)
CO2 SERPL-SCNC: 9 MMOL/L (ref 21–32)
COLOR UR: YELLOW
CREAT BLD-MCNC: 0.5 MG/DL (ref 0.6–1.3)
CREAT SERPL-MCNC: 0.89 MG/DL (ref 0.6–1.3)
CREAT SERPL-MCNC: 0.92 MG/DL (ref 0.6–1.3)
CREAT SERPL-MCNC: 1.14 MG/DL (ref 0.6–1.3)
EOSINOPHIL # BLD AUTO: 0.01 THOUSAND/ΜL (ref 0–0.61)
EOSINOPHIL NFR BLD AUTO: 0 % (ref 0–6)
ERYTHROCYTE [DISTWIDTH] IN BLOOD BY AUTOMATED COUNT: 13.8 % (ref 11.6–15.1)
FIO2 GAS DIL.REBREATH: 21 L
FIO2 GAS DIL.REBREATH: 21 L
GFR SERPL CREATININE-BSD FRML MDRD: 104 ML/MIN/1.73SQ M
GFR SERPL CREATININE-BSD FRML MDRD: 108 ML/MIN/1.73SQ M
GFR SERPL CREATININE-BSD FRML MDRD: 137 ML/MIN/1.73SQ M
GFR SERPL CREATININE-BSD FRML MDRD: 81 ML/MIN/1.73SQ M
GLUCOSE SERPL-MCNC: 211 MG/DL (ref 65–140)
GLUCOSE SERPL-MCNC: 232 MG/DL (ref 65–140)
GLUCOSE SERPL-MCNC: 250 MG/DL (ref 65–140)
GLUCOSE SERPL-MCNC: 264 MG/DL (ref 65–140)
GLUCOSE SERPL-MCNC: 266 MG/DL (ref 65–140)
GLUCOSE SERPL-MCNC: 267 MG/DL (ref 65–140)
GLUCOSE SERPL-MCNC: 298 MG/DL (ref 65–140)
GLUCOSE SERPL-MCNC: 299 MG/DL (ref 65–140)
GLUCOSE SERPL-MCNC: 308 MG/DL (ref 65–140)
GLUCOSE UR STRIP-MCNC: ABNORMAL MG/DL
HCO3 BLDA-SCNC: 5.5 MMOL/L (ref 22–28)
HCO3 BLDA-SCNC: 5.5 MMOL/L (ref 22–28)
HCT VFR BLD AUTO: 46.3 % (ref 36.5–49.3)
HCT VFR BLD CALC: 47 % (ref 36.5–49.3)
HGB BLD-MCNC: 15.5 G/DL (ref 12–17)
HGB BLDA-MCNC: 16 G/DL (ref 12–17)
HGB UR QL STRIP.AUTO: ABNORMAL
HYALINE CASTS #/AREA URNS LPF: ABNORMAL /LPF
INR PPP: 1 (ref 0.86–1.16)
KETONES UR STRIP-MCNC: ABNORMAL MG/DL
LACTATE SERPL-SCNC: 4.8 MMOL/L (ref 0.5–2)
LACTATE SERPL-SCNC: 5.7 MMOL/L (ref 0.5–2)
LEUKOCYTE ESTERASE UR QL STRIP: NEGATIVE
LIPASE SERPL-CCNC: 45 U/L (ref 73–393)
LYMPHOCYTES # BLD AUTO: 1.46 THOUSANDS/ΜL (ref 0.6–4.47)
LYMPHOCYTES NFR BLD AUTO: 14 % (ref 14–44)
MAGNESIUM SERPL-MCNC: 2 MG/DL (ref 1.6–2.6)
MAGNESIUM SERPL-MCNC: 2.1 MG/DL (ref 1.6–2.6)
MAGNESIUM SERPL-MCNC: 2.2 MG/DL (ref 1.6–2.6)
MCH RBC QN AUTO: 32.3 PG (ref 26.8–34.3)
MCHC RBC AUTO-ENTMCNC: 33.5 G/DL (ref 31.4–37.4)
MCV RBC AUTO: 97 FL (ref 82–98)
MONOCYTES # BLD AUTO: 0.62 THOUSAND/ΜL (ref 0.17–1.22)
MONOCYTES NFR BLD AUTO: 6 % (ref 4–12)
NEUTROPHILS # BLD AUTO: 8.55 THOUSANDS/ΜL (ref 1.85–7.62)
NEUTS SEG NFR BLD AUTO: 79 % (ref 43–75)
NITRITE UR QL STRIP: NEGATIVE
NON-SQ EPI CELLS URNS QL MICRO: ABNORMAL /HPF
PCO2 BLD: 17.8 MM HG (ref 36–44)
PCO2 BLD: 18.3 MM HG (ref 36–44)
PCO2 BLD: 6 MMOL/L (ref 21–32)
PCO2 BLD: 6 MMOL/L (ref 21–32)
PCO2 BLD: 8 MMOL/L (ref 21–32)
PH BLD: 7.09 [PH] (ref 7.35–7.45)
PH BLD: 7.1 [PH] (ref 7.35–7.45)
PH UR STRIP.AUTO: 5.5 [PH] (ref 4.5–8)
PHOSPHATE SERPL-MCNC: 3 MG/DL (ref 2.7–4.5)
PHOSPHATE SERPL-MCNC: 4 MG/DL (ref 2.7–4.5)
PLATELET # BLD AUTO: 287 THOUSANDS/UL (ref 149–390)
PMV BLD AUTO: 10.5 FL (ref 8.9–12.7)
PO2 BLD: 114 MM HG (ref 75–129)
PO2 BLD: 116 MM HG (ref 75–129)
POTASSIUM BLD-SCNC: 4.8 MMOL/L (ref 3.5–5.3)
POTASSIUM SERPL-SCNC: 4.6 MMOL/L (ref 3.5–5.3)
POTASSIUM SERPL-SCNC: 5.1 MMOL/L (ref 3.5–5.3)
POTASSIUM SERPL-SCNC: 5.6 MMOL/L (ref 3.5–5.3)
PROT SERPL-MCNC: 8.6 G/DL (ref 6.4–8.2)
PROT UR STRIP-MCNC: ABNORMAL MG/DL
PROTHROMBIN TIME: 13 SECONDS (ref 12.1–14.4)
RBC # BLD AUTO: 4.8 MILLION/UL (ref 3.88–5.62)
RBC #/AREA URNS AUTO: ABNORMAL /HPF
SAMPLE SITE: ABNORMAL
SAMPLE SITE: ABNORMAL
SAO2 % BLD FROM PO2: 97 % (ref 95–98)
SAO2 % BLD FROM PO2: 97 % (ref 95–98)
SODIUM BLD-SCNC: 134 MMOL/L (ref 136–145)
SODIUM SERPL-SCNC: 134 MMOL/L (ref 136–145)
SODIUM SERPL-SCNC: 134 MMOL/L (ref 136–145)
SODIUM SERPL-SCNC: 135 MMOL/L (ref 136–145)
SP GR UR STRIP.AUTO: >=1.03 (ref 1–1.03)
SPECIMEN SOURCE: ABNORMAL
TROPONIN I SERPL-MCNC: <0.02 NG/ML
UROBILINOGEN UR QL STRIP.AUTO: 0.2 E.U./DL
WBC # BLD AUTO: 10.7 THOUSAND/UL (ref 4.31–10.16)
WBC #/AREA URNS AUTO: ABNORMAL /HPF

## 2017-11-04 PROCEDURE — 83690 ASSAY OF LIPASE: CPT | Performed by: NURSE PRACTITIONER

## 2017-11-04 PROCEDURE — 83735 ASSAY OF MAGNESIUM: CPT | Performed by: NURSE PRACTITIONER

## 2017-11-04 PROCEDURE — 80047 BASIC METABLC PNL IONIZED CA: CPT

## 2017-11-04 PROCEDURE — 84100 ASSAY OF PHOSPHORUS: CPT | Performed by: NURSE PRACTITIONER

## 2017-11-04 PROCEDURE — 80053 COMPREHEN METABOLIC PANEL: CPT | Performed by: NURSE PRACTITIONER

## 2017-11-04 PROCEDURE — 96376 TX/PRO/DX INJ SAME DRUG ADON: CPT

## 2017-11-04 PROCEDURE — 82550 ASSAY OF CK (CPK): CPT | Performed by: NURSE PRACTITIONER

## 2017-11-04 PROCEDURE — 84100 ASSAY OF PHOSPHORUS: CPT | Performed by: INTERNAL MEDICINE

## 2017-11-04 PROCEDURE — 85730 THROMBOPLASTIN TIME PARTIAL: CPT | Performed by: NURSE PRACTITIONER

## 2017-11-04 PROCEDURE — 82948 REAGENT STRIP/BLOOD GLUCOSE: CPT

## 2017-11-04 PROCEDURE — 85014 HEMATOCRIT: CPT

## 2017-11-04 PROCEDURE — 82553 CREATINE MB FRACTION: CPT | Performed by: NURSE PRACTITIONER

## 2017-11-04 PROCEDURE — 36600 WITHDRAWAL OF ARTERIAL BLOOD: CPT

## 2017-11-04 PROCEDURE — 84484 ASSAY OF TROPONIN QUANT: CPT | Performed by: NURSE PRACTITIONER

## 2017-11-04 PROCEDURE — 83735 ASSAY OF MAGNESIUM: CPT | Performed by: INTERNAL MEDICINE

## 2017-11-04 PROCEDURE — 96365 THER/PROPH/DIAG IV INF INIT: CPT

## 2017-11-04 PROCEDURE — 36415 COLL VENOUS BLD VENIPUNCTURE: CPT | Performed by: NURSE PRACTITIONER

## 2017-11-04 PROCEDURE — 83605 ASSAY OF LACTIC ACID: CPT | Performed by: NURSE PRACTITIONER

## 2017-11-04 PROCEDURE — C9113 INJ PANTOPRAZOLE SODIUM, VIA: HCPCS | Performed by: INTERNAL MEDICINE

## 2017-11-04 PROCEDURE — 82009 KETONE BODYS QUAL: CPT | Performed by: NURSE PRACTITIONER

## 2017-11-04 PROCEDURE — 96375 TX/PRO/DX INJ NEW DRUG ADDON: CPT

## 2017-11-04 PROCEDURE — 82803 BLOOD GASES ANY COMBINATION: CPT

## 2017-11-04 PROCEDURE — 99285 EMERGENCY DEPT VISIT HI MDM: CPT

## 2017-11-04 PROCEDURE — 87040 BLOOD CULTURE FOR BACTERIA: CPT | Performed by: NURSE PRACTITIONER

## 2017-11-04 PROCEDURE — 81001 URINALYSIS AUTO W/SCOPE: CPT | Performed by: NURSE PRACTITIONER

## 2017-11-04 PROCEDURE — 80048 BASIC METABOLIC PNL TOTAL CA: CPT | Performed by: INTERNAL MEDICINE

## 2017-11-04 PROCEDURE — 85025 COMPLETE CBC W/AUTO DIFF WBC: CPT | Performed by: NURSE PRACTITIONER

## 2017-11-04 PROCEDURE — 85610 PROTHROMBIN TIME: CPT | Performed by: NURSE PRACTITIONER

## 2017-11-04 PROCEDURE — 80048 BASIC METABOLIC PNL TOTAL CA: CPT | Performed by: NURSE PRACTITIONER

## 2017-11-04 PROCEDURE — 96374 THER/PROPH/DIAG INJ IV PUSH: CPT

## 2017-11-04 PROCEDURE — 96361 HYDRATE IV INFUSION ADD-ON: CPT

## 2017-11-04 RX ORDER — METHADONE HYDROCHLORIDE 5 MG/1
115 TABLET ORAL ONCE
Status: COMPLETED | OUTPATIENT
Start: 2017-11-04 | End: 2017-11-04

## 2017-11-04 RX ORDER — DEXTROSE AND SODIUM CHLORIDE 5; .9 G/100ML; G/100ML
250 INJECTION, SOLUTION INTRAVENOUS CONTINUOUS
Status: DISCONTINUED | OUTPATIENT
Start: 2017-11-04 | End: 2017-11-05

## 2017-11-04 RX ORDER — SODIUM CHLORIDE 9 MG/ML
1000 INJECTION, SOLUTION INTRAVENOUS CONTINUOUS
Status: DISPENSED | OUTPATIENT
Start: 2017-11-04 | End: 2017-11-04

## 2017-11-04 RX ORDER — PROMETHAZINE HYDROCHLORIDE 25 MG/ML
25 INJECTION, SOLUTION INTRAMUSCULAR; INTRAVENOUS EVERY 4 HOURS PRN
Status: DISCONTINUED | OUTPATIENT
Start: 2017-11-04 | End: 2017-11-07 | Stop reason: HOSPADM

## 2017-11-04 RX ORDER — DEXTROSE AND SODIUM CHLORIDE 5; .9 G/100ML; G/100ML
250 INJECTION, SOLUTION INTRAVENOUS CONTINUOUS
Status: DISCONTINUED | OUTPATIENT
Start: 2017-11-04 | End: 2017-11-04

## 2017-11-04 RX ORDER — POTASSIUM CHLORIDE 20 MEQ/1
20 TABLET, EXTENDED RELEASE ORAL ONCE
Status: COMPLETED | OUTPATIENT
Start: 2017-11-04 | End: 2017-11-05

## 2017-11-04 RX ORDER — MAGNESIUM SULFATE HEPTAHYDRATE 40 MG/ML
4 INJECTION, SOLUTION INTRAVENOUS ONCE
Status: DISCONTINUED | OUTPATIENT
Start: 2017-11-04 | End: 2017-11-04

## 2017-11-04 RX ORDER — POTASSIUM CHLORIDE 14.9 MG/ML
20 INJECTION INTRAVENOUS
Status: DISCONTINUED | OUTPATIENT
Start: 2017-11-04 | End: 2017-11-04

## 2017-11-04 RX ORDER — LORAZEPAM 2 MG/ML
1 INJECTION INTRAMUSCULAR ONCE
Status: COMPLETED | OUTPATIENT
Start: 2017-11-04 | End: 2017-11-04

## 2017-11-04 RX ORDER — SODIUM CHLORIDE 9 MG/ML
2000 INJECTION, SOLUTION INTRAVENOUS CONTINUOUS
Status: DISCONTINUED | OUTPATIENT
Start: 2017-11-04 | End: 2017-11-04

## 2017-11-04 RX ORDER — METOCLOPRAMIDE HYDROCHLORIDE 5 MG/ML
10 INJECTION INTRAMUSCULAR; INTRAVENOUS ONCE
Status: COMPLETED | OUTPATIENT
Start: 2017-11-04 | End: 2017-11-04

## 2017-11-04 RX ORDER — MAGNESIUM SULFATE HEPTAHYDRATE 40 MG/ML
2 INJECTION, SOLUTION INTRAVENOUS ONCE
Status: DISCONTINUED | OUTPATIENT
Start: 2017-11-04 | End: 2017-11-04

## 2017-11-04 RX ORDER — ONDANSETRON 2 MG/ML
4 INJECTION INTRAMUSCULAR; INTRAVENOUS EVERY 4 HOURS PRN
Status: DISCONTINUED | OUTPATIENT
Start: 2017-11-04 | End: 2017-11-07 | Stop reason: HOSPADM

## 2017-11-04 RX ORDER — ONDANSETRON 2 MG/ML
4 INJECTION INTRAMUSCULAR; INTRAVENOUS ONCE
Status: COMPLETED | OUTPATIENT
Start: 2017-11-04 | End: 2017-11-04

## 2017-11-04 RX ORDER — 0.9 % SODIUM CHLORIDE 0.9 %
3 VIAL (ML) INJECTION AS NEEDED
Status: DISCONTINUED | OUTPATIENT
Start: 2017-11-04 | End: 2017-11-07 | Stop reason: HOSPADM

## 2017-11-04 RX ORDER — SODIUM CHLORIDE 9 MG/ML
500 INJECTION, SOLUTION INTRAVENOUS CONTINUOUS
Status: DISCONTINUED | OUTPATIENT
Start: 2017-11-04 | End: 2017-11-04

## 2017-11-04 RX ORDER — SODIUM CHLORIDE 9 MG/ML
500 INJECTION, SOLUTION INTRAVENOUS CONTINUOUS
Status: DISCONTINUED | OUTPATIENT
Start: 2017-11-04 | End: 2017-11-05

## 2017-11-04 RX ORDER — MORPHINE SULFATE 4 MG/ML
4 INJECTION, SOLUTION INTRAMUSCULAR; INTRAVENOUS ONCE
Status: COMPLETED | OUTPATIENT
Start: 2017-11-04 | End: 2017-11-04

## 2017-11-04 RX ORDER — SODIUM CHLORIDE 9 MG/ML
250 INJECTION, SOLUTION INTRAVENOUS CONTINUOUS
Status: DISCONTINUED | OUTPATIENT
Start: 2017-11-05 | End: 2017-11-05

## 2017-11-04 RX ORDER — PANTOPRAZOLE SODIUM 40 MG/1
40 INJECTION, POWDER, FOR SOLUTION INTRAVENOUS EVERY 12 HOURS SCHEDULED
Status: DISCONTINUED | OUTPATIENT
Start: 2017-11-04 | End: 2017-11-07 | Stop reason: SDUPTHER

## 2017-11-04 RX ORDER — DEXTROSE AND SODIUM CHLORIDE 5; .9 G/100ML; G/100ML
125 INJECTION, SOLUTION INTRAVENOUS CONTINUOUS
Status: DISCONTINUED | OUTPATIENT
Start: 2017-11-04 | End: 2017-11-06

## 2017-11-04 RX ORDER — POTASSIUM CHLORIDE 14.9 MG/ML
20 INJECTION INTRAVENOUS ONCE
Status: COMPLETED | OUTPATIENT
Start: 2017-11-04 | End: 2017-11-05

## 2017-11-04 RX ORDER — SODIUM CHLORIDE 9 MG/ML
250 INJECTION, SOLUTION INTRAVENOUS CONTINUOUS
Status: DISCONTINUED | OUTPATIENT
Start: 2017-11-04 | End: 2017-11-04

## 2017-11-04 RX ADMIN — MORPHINE SULFATE 4 MG: 4 INJECTION, SOLUTION INTRAMUSCULAR; INTRAVENOUS at 17:55

## 2017-11-04 RX ADMIN — PANTOPRAZOLE SODIUM 40 MG: 40 INJECTION, POWDER, FOR SOLUTION INTRAVENOUS at 23:32

## 2017-11-04 RX ADMIN — SODIUM CHLORIDE 2 UNITS/HR: 9 INJECTION, SOLUTION INTRAVENOUS at 18:16

## 2017-11-04 RX ADMIN — SODIUM CHLORIDE 4 UNITS/HR: 9 INJECTION, SOLUTION INTRAVENOUS at 22:10

## 2017-11-04 RX ADMIN — ONDANSETRON 4 MG: 2 INJECTION INTRAMUSCULAR; INTRAVENOUS at 16:50

## 2017-11-04 RX ADMIN — PROMETHAZINE HYDROCHLORIDE 25 MG: 25 INJECTION, SOLUTION INTRAMUSCULAR; INTRAVENOUS at 22:20

## 2017-11-04 RX ADMIN — LORAZEPAM 1 MG: 2 INJECTION, SOLUTION INTRAMUSCULAR; INTRAVENOUS at 19:15

## 2017-11-04 RX ADMIN — PROMETHAZINE HYDROCHLORIDE 25 MG: 25 INJECTION, SOLUTION INTRAMUSCULAR; INTRAVENOUS at 17:27

## 2017-11-04 RX ADMIN — DEXTROSE AND SODIUM CHLORIDE 250 ML/HR: 5; .9 INJECTION, SOLUTION INTRAVENOUS at 17:29

## 2017-11-04 RX ADMIN — LORAZEPAM 1 MG: 2 INJECTION, SOLUTION INTRAMUSCULAR; INTRAVENOUS at 17:37

## 2017-11-04 RX ADMIN — METHADONE HYDROCHLORIDE 115 MG: 10 TABLET ORAL at 18:40

## 2017-11-04 RX ADMIN — SODIUM CHLORIDE 500 ML/HR: 0.9 INJECTION, SOLUTION INTRAVENOUS at 23:39

## 2017-11-04 RX ADMIN — SODIUM CHLORIDE 1000 ML: 0.9 INJECTION, SOLUTION INTRAVENOUS at 22:17

## 2017-11-04 RX ADMIN — ONDANSETRON 4 MG: 2 INJECTION INTRAMUSCULAR; INTRAVENOUS at 20:24

## 2017-11-04 RX ADMIN — SODIUM CHLORIDE 1000 ML: 0.9 INJECTION, SOLUTION INTRAVENOUS at 17:13

## 2017-11-04 RX ADMIN — DEXTROSE AND SODIUM CHLORIDE 250 ML/HR: 5; 900 INJECTION, SOLUTION INTRAVENOUS at 22:03

## 2017-11-04 RX ADMIN — METOCLOPRAMIDE 10 MG: 5 INJECTION, SOLUTION INTRAMUSCULAR; INTRAVENOUS at 17:52

## 2017-11-04 RX ADMIN — SODIUM CHLORIDE 1000 ML: 0.9 INJECTION, SOLUTION INTRAVENOUS at 16:53

## 2017-11-04 RX ADMIN — SODIUM CHLORIDE 1000 ML/HR: 0.9 INJECTION, SOLUTION INTRAVENOUS at 22:20

## 2017-11-04 NOTE — Clinical Note
Case was discussed with Dr Dorethea Meigs and the patient's admission status was agreed to be Admission Status: inpatient status to the service of Dr Dorethea Meigs

## 2017-11-04 NOTE — ED NOTES
Insulin drip upped to 3 units/hr per provider based on new chem results        Ashley Peter RN  11/04/17 1940

## 2017-11-04 NOTE — ED PROVIDER NOTES
History  Chief Complaint   Patient presents with    Abdominal Pain     Pt was here and admited for vomiting and nausea  Pt discharged two days ago and was unable to get his Levemir filled so he hasn't taken his insulin in 2 days  Pt started this AM with abd pain, nausea, and vomiting  Pt states he didn't make it to the methadone clinic today becasue he jsut didn't feel well  Pt states he hasn't had a BM in 5 days     Patient was recently discharged from here 2 days ago after being treated for diabetic ketoacidosis with severe constipation  He was discharged with a prescription for Levemir insulin however was unable to get the prescription filled because his insurance required a prior authorization  He has been without Levemir insulin for 2 days  He also states that he is extremely constipated and has not had a bowel movement for the past 5 days  He was able to get relief with soapsuds enema however he has not been tried on Amitiza, Linzess, or Trulance  He presents today with severe vomiting, abdominal pain, anxiety, heart racing  He also states he was unable to get to the methadone clinic today as he was vomiting profusely  Prior to Admission Medications   Prescriptions Last Dose Informant Patient Reported? Taking?   insulin aspart (NovoLOG) 100 units/mL injection   Yes Yes   Sig: Inject under the skin 3 (three) times a day before meals     insulin detemir (LEVEMIR) 100 units/mL subcutaneous injection   No Yes   Sig: Inject 30 Units under the skin daily at bedtime for 30 days   methadone (DOLOPHINE) 10 MG/5ML solution   Yes Yes   Sig: Take 115 mg by mouth daily ,       Facility-Administered Medications: None       Past Medical History:   Diagnosis Date    Diabetes mellitus ([facility] [address] )     Narcotic abuse        Past Surgical History:   Procedure Laterality Date    ESOPHAGOGASTRODUODENOSCOPY N/A 10/30/2017    Procedure: ESOPHAGOGASTRODUODENOSCOPY (EGD);   Surgeon: Jaqueline Lombard, MD;  Location: [facility] MAIN OR;  Service: Gastroenterology       Family History   Problem Relation Age of Onset    Family history unknown: Yes     I have reviewed and agree with the history as documented  Social History   Substance Use Topics    Smoking status: Current Every Day Smoker     Packs/day: 0 20     Years: 10 00     Types: Cigarettes    Smokeless tobacco: Never Used      Comment: 4-5 cigarettes/day    Alcohol use Yes      Comment: Socially        Review of Systems   Gastrointestinal: Positive for abdominal pain, constipation, nausea and vomiting  Neurological: Positive for dizziness and weakness  All other systems reviewed and are negative  Physical Exam  ED Triage Vitals [11/04/17 1637]   Temperature Pulse Respirations Blood Pressure SpO2   98 1 °F (36 7 °C) (!) 138 20 140/79 97 %      Temp src Heart Rate Source Patient Position - Orthostatic VS BP Location FiO2 (%)   -- -- -- -- --      Pain Score       7           Orthostatic Vital Signs  Vitals:    11/04/17 1745 11/04/17 1830 11/04/17 1845 11/04/17 1900   BP:  154/72  156/81   Pulse: (!) 121 (!) 129 (!) 128 (!) 122       Physical Exam   Constitutional: He is oriented to person, place, and time  He appears well-developed and well-nourished  HENT:   Head: Normocephalic and atraumatic  Eyes: Conjunctivae and EOM are normal  Pupils are equal, round, and reactive to light  Neck: Normal range of motion  Neck supple  Cardiovascular: Normal rate, regular rhythm, normal heart sounds and intact distal pulses  Exam reveals no gallop and no friction rub  No murmur heard  Pulmonary/Chest: Effort normal and breath sounds normal    Abdominal: Soft  Bowel sounds are normal  He exhibits no distension  There is no hepatosplenomegaly  There is generalized tenderness  Musculoskeletal: Normal range of motion  He exhibits no edema  Neurological: He is alert and oriented to person, place, and time  Skin: Skin is warm and dry   Capillary refill takes less than 2 seconds  Nursing note and vitals reviewed        ED Medications  Medications   sodium chloride (PF) 0 9 % injection 3 mL (not administered)   promethazine (PHENERGAN) injection 25 mg (25 mg Intravenous Given 11/4/17 1727)   dextrose 5 % and sodium chloride 0 9 % infusion (250 mL/hr Intravenous New Bag 11/4/17 1729)   insulin regular (HumuLIN R,NovoLIN R) 1 Units/mL in sodium chloride 0 9 % 100 mL infusion (2 Units/hr Intravenous New Bag 11/4/17 1816)   insulin regular (HumuLIN R,NovoLIN R) 100 units/mL injection **AcuDose Override Pull** (not administered)   ondansetron (ZOFRAN) injection 4 mg (4 mg Intravenous Given 11/4/17 1650)   sodium chloride 0 9 % bolus 1,000 mL (0 mL Intravenous Stopped 11/4/17 1738)   sodium chloride 0 9 % bolus 1,000 mL (0 mL Intravenous Stopped 11/4/17 1824)   methadone (DOLOPHINE) tablet 115 mg (115 mg Oral Given 11/4/17 1840)   morphine (PF) 4 mg/mL injection 4 mg (4 mg Intravenous Given 11/4/17 1755)   LORazepam (ATIVAN) 2 mg/mL injection 1 mg (1 mg Intravenous Given 11/4/17 1737)   metoclopramide (REGLAN) injection 10 mg (10 mg Intravenous Given 11/4/17 1752)   LORazepam (ATIVAN) 2 mg/mL injection 1 mg (1 mg Intravenous Given 11/4/17 1915)       Diagnostic Studies  Results Reviewed     Procedure Component Value Units Date/Time    Lactic acid x2 Q2H [[id number]]     Lab Status:  No result Specimen:  Blood     Lactic acid x2 Q2H [[id number]]     Lab Status:  No result Specimen:  Blood     Basic metabolic panel [[id number]]     Lab Status:  No result Specimen:  Blood     Magnesium [[id number]]     Lab Status:  No result Specimen:  Blood     Urine Microscopic [[id number]]  (Abnormal) Collected:  11/04/17 1826    Lab Status:  Final result Specimen:  Urine from Urine, Clean Catch Updated:  11/04/17 1844     RBC, UA 0-1 (A) /hpf      WBC, UA None Seen /hpf      Epithelial Cells None Seen /hpf      Bacteria, UA None Seen /hpf      Hyaline Casts, UA 0-1 (A) /lpf     UA w Reflex to Microscopic [[id number]]  (Abnormal) Collected:  11/04/17 1826    Lab Status:  Final result Specimen:  Urine from Urine, Clean Catch Updated:  11/04/17 1833     Color, UA Yellow     Clarity, UA Clear     Specific Gravity, UA >=1 030     pH, UA 5 5     Leukocytes, UA Negative     Nitrite, UA Negative     Protein, UA Trace (A) mg/dl      Glucose,  (1/2%) (A) mg/dl      Ketones, UA >=80 (3+) (A) mg/dl      Urobilinogen, UA 0 2 E U /dl      Bilirubin, UA Negative     Blood, UA Trace-Intact (A)    Fingerstick Glucose (POCT) [[id number]]  (Abnormal) Collected:  11/04/17 1825    Lab Status:  Final result Updated:  11/04/17 1826     POC Glucose 267 (H) mg/dl     Troponin I [[id number]]  (Normal) Collected:  11/04/17 1700    Lab Status:  Final result Specimen:  Blood from Arm, Left Updated:  11/04/17 1817     Troponin I <0 02 ng/mL     Narrative:         [employer] Chemistry analyzer 99% cutoff is > 0 04 ng/mL in network labs    o cTnI 99% cutoff is useful only when applied to patients in the clinical setting of myocardial ischemia  o cTnI 99% cutoff should be interpreted in the context of clinical history, ECG findings and possibly cardiac imaging to establish correct diagnosis  o cTnI 99% cutoff may be suggestive but clearly not indicative of a coronary event without the clinical setting of myocardial ischemia  Lactic acid, plasma [[id number]]  (Abnormal) Collected:  11/04/17 1713    Lab Status:  Final result Specimen:  Blood from Hand, Right Updated:  11/04/17 1751     LACTIC ACID 5 7 (HH) mmol/L     Narrative:         Result may be elevated if tourniquet was used during collection      CKMB [[id number]]  (Normal) Collected:  11/04/17 1700    Lab Status:  Final result Specimen:  Blood from Arm, Left Updated:  11/04/17 1745     CK-MB Index 1 1 %      CK-MB FRACTION 2 5 ng/mL     Lipase [[id number]]  (Abnormal) Collected:  11/04/17 1700    Lab Status:  Final result Specimen:  Blood from Arm, Left Updated:  11/04/17 1743     Lipase 45 (L) u/L Magnesium [[id number]]  (Normal) Collected:  11/04/17 1700    Lab Status:  Final result Specimen:  Blood from Arm, Left Updated:  11/04/17 1743     Magnesium 2 1 mg/dL     Phosphorus [[id number]]  (Normal) Collected:  11/04/17 1700    Lab Status:  Final result Specimen:  Blood from Arm, Left Updated:  11/04/17 1743     Phosphorus 4 0 mg/dL     CK (with reflex to MB) [[id number]]  (Normal) Collected:  11/04/17 1700    Lab Status:  Final result Specimen:  Blood from Arm, Left Updated:  11/04/17 1743     Total  U/L     CMP [[id number]]  (Abnormal) Collected:  11/04/17 1700    Lab Status:  Final result Specimen:  Blood from Arm, Left Updated:  11/04/17 1732     Sodium 134 (L) mmol/L      Potassium 4 6 mmol/L      Chloride 93 (L) mmol/L      CO2 12 (L) mmol/L      Anion Gap 29 (H) mmol/L      BUN 14 mg/dL      Creatinine 1 14 mg/dL      Glucose 298 (H) mg/dL      Calcium 9 2 mg/dL       (H) U/L       (H) U/L      Alkaline Phosphatase 157 (H) U/L      Total Protein 8 6 (H) g/dL      Albumin 4 8 g/dL      Total Bilirubin 0 90 mg/dL      eGFR 81 ml/min/1 73sq m     Narrative:         National Kidney Disease Education Program recommendations are as follows:  GFR calculation is accurate only with a steady state creatinine  Chronic Kidney disease less than 60 ml/min/1 73 sq  meters  Kidney failure less than 15 ml/min/1 73 sq  meters  Fingerstick Glucose (POCT) [[id number]]  (Abnormal) Collected:  11/04/17 1727    Lab Status:  Final result Updated:  11/04/17 1728     POC Glucose 250 (H) mg/dl     Blood culture - 2nd set [[id number]] Collected:  11/04/17 1713    Lab Status: In process Specimen:  Blood from Hand, Right Updated:  11/04/17 1724    Blood culture - 1st Set [[id number]] Collected:  11/04/17 1713    Lab Status:   In process Specimen:  Blood from Arm, Right Updated:  11/04/17 1724    APTT [[id number]]  (Normal) Collected:  11/04/17 1700    Lab Status:  Final result Specimen:  Blood from Arm, Left Updated: 11/04/17 1722     PTT 28 seconds     Narrative:          Therapeutic Heparin Range = 60-90 seconds    Protime-INR [[id number]]  (Normal) Collected:  11/04/17 1700    Lab Status:  Final result Specimen:  Blood from Arm, Left Updated:  11/04/17 1722     Protime 13 0 seconds      INR 1 00    Acetone [[id number]]  (Abnormal) Collected:  11/04/17 1700    Lab Status:  Final result Specimen:  Blood from Arm, Left Updated:  11/04/17 1717     Acetone, Bld 4+ (A)    CBC and differential [[id number]]  (Abnormal) Collected:  11/04/17 1700    Lab Status:  Final result Specimen:  Blood from Arm, Left Updated:  11/04/17 1709     WBC 10 70 (H) Thousand/uL      RBC 4 80 Million/uL      Hemoglobin 15 5 g/dL      Hematocrit 46 3 %      MCV 97 fL      MCH 32 3 pg      MCHC 33 5 g/dL      RDW 13 8 %      MPV 10 5 fL      Platelets 444 Thousands/uL      Neutrophils Relative 79 (H) %      Lymphocytes Relative 14 %      Monocytes Relative 6 %      Eosinophils Relative 0 %      Basophils Relative 1 %      Neutrophils Absolute 8 55 (H) Thousands/µL      Lymphocytes Absolute 1 46 Thousands/µL      Monocytes Absolute 0 62 Thousand/µL      Eosinophils Absolute 0 01 Thousand/µL      Basophils Absolute 0 06 Thousands/µL     Fingerstick Glucose (POCT) [[id number]]  (Abnormal) Collected:  11/04/17 1640    Lab Status:  Final result Updated:  11/04/17 1642     POC Glucose 232 (H) mg/dl                  No orders to display              Procedures  Procedures       Phone Contacts  ED Phone Contact    ED Course  ED Course                                MDM  Number of Diagnoses or Management Options  Anxiety: established and worsening  Constipation: established and worsening  Narcotic dependency, continuous ([facility] [address] ): established and worsening  Type 1 diabetes mellitus with ketoacidotic coma ([facility] [address] ): established and worsening  Diagnosis management comments:  Differential diagnosis includes gastroenteritis, diabetic ketoacidosis, narcotic withdrawal   in addition he has severe anxiety       Amount and/or Complexity of Data Reviewed  Clinical lab tests: ordered and reviewed  Decide to obtain previous medical records or to obtain history from someone other than the patient: yes  Review and summarize past medical records: yes  Discuss the patient with other providers: yes    Patient Progress  Patient progress: stable    The patient presented with a condition in which there was a high probability of imminent or life-threatening deterioration, and critical care services (excluding separately billable procedures) totalled 30-74 minutes  Disposition  Final diagnoses:   Constipation   Type 1 diabetes mellitus with ketoacidotic coma ([address] )   Narcotic dependency, continuous ([facility] [address] )   Anxiety     Time reflects when diagnosis was documented in both MDM as applicable and the Disposition within this note     Time User Action Codes Description Comment    11/4/2017  7:23 PM Milus Gelineau Add [K59 00] Constipation     11/4/2017  7:23 PM Milus Gelineau Add [E10 11] Type 1 diabetes mellitus with ketoacidotic coma ([facility] [address] )     11/4/2017  7:23 PM Milus Gelineau Add [T05 87] Narcotic dependency, continuous ([address] )     11/4/2017  7:23 PM Milus Gelineau Add [F41 9] Anxiety       ED Disposition     ED Disposition Condition Comment    Admit  Case was discussed with Dr Skeeter Gitelman and the patient's admission status was agreed to be Admission Status: inpatient status to the service of Dr Skeeter Gitelman   Follow-up Information    None       Patient's Medications   Discharge Prescriptions    No medications on file     No discharge procedures on file      ED Provider  Electronically Signed by           ALESSIO Freeman  11/04/17 1959

## 2017-11-05 LAB
ANION GAP SERPL CALCULATED.3IONS-SCNC: 12 MMOL/L (ref 4–13)
ANION GAP SERPL CALCULATED.3IONS-SCNC: 17 MMOL/L (ref 4–13)
BUN SERPL-MCNC: 8 MG/DL (ref 5–25)
BUN SERPL-MCNC: 9 MG/DL (ref 5–25)
CALCIUM SERPL-MCNC: 7.2 MG/DL (ref 8.3–10.1)
CALCIUM SERPL-MCNC: 7.4 MG/DL (ref 8.3–10.1)
CHLORIDE SERPL-SCNC: 104 MMOL/L (ref 100–108)
CHLORIDE SERPL-SCNC: 105 MMOL/L (ref 100–108)
CO2 SERPL-SCNC: 14 MMOL/L (ref 21–32)
CO2 SERPL-SCNC: 19 MMOL/L (ref 21–32)
CREAT SERPL-MCNC: 0.8 MG/DL (ref 0.6–1.3)
CREAT SERPL-MCNC: 0.93 MG/DL (ref 0.6–1.3)
ERYTHROCYTE [DISTWIDTH] IN BLOOD BY AUTOMATED COUNT: 13.7 % (ref 11.6–15.1)
GFR SERPL CREATININE-BSD FRML MDRD: 103 ML/MIN/1.73SQ M
GFR SERPL CREATININE-BSD FRML MDRD: 113 ML/MIN/1.73SQ M
GLUCOSE SERPL-MCNC: 136 MG/DL (ref 65–140)
GLUCOSE SERPL-MCNC: 178 MG/DL (ref 65–140)
GLUCOSE SERPL-MCNC: 180 MG/DL (ref 65–140)
GLUCOSE SERPL-MCNC: 181 MG/DL (ref 65–140)
GLUCOSE SERPL-MCNC: 189 MG/DL (ref 65–140)
GLUCOSE SERPL-MCNC: 194 MG/DL (ref 65–140)
GLUCOSE SERPL-MCNC: 200 MG/DL (ref 65–140)
GLUCOSE SERPL-MCNC: 207 MG/DL (ref 65–140)
GLUCOSE SERPL-MCNC: 209 MG/DL (ref 65–140)
GLUCOSE SERPL-MCNC: 210 MG/DL (ref 65–140)
GLUCOSE SERPL-MCNC: 216 MG/DL (ref 65–140)
GLUCOSE SERPL-MCNC: 221 MG/DL (ref 65–140)
GLUCOSE SERPL-MCNC: 224 MG/DL (ref 65–140)
GLUCOSE SERPL-MCNC: 239 MG/DL (ref 65–140)
GLUCOSE SERPL-MCNC: 244 MG/DL (ref 65–140)
GLUCOSE SERPL-MCNC: 302 MG/DL (ref 65–140)
GLUCOSE SERPL-MCNC: 326 MG/DL (ref 65–140)
GLUCOSE SERPL-MCNC: 330 MG/DL (ref 65–140)
GLUCOSE SERPL-MCNC: 92 MG/DL (ref 65–140)
HCT VFR BLD AUTO: 35.6 % (ref 36.5–49.3)
HGB BLD-MCNC: 12 G/DL (ref 12–17)
MAGNESIUM SERPL-MCNC: 2 MG/DL (ref 1.6–2.6)
MAGNESIUM SERPL-MCNC: 2.2 MG/DL (ref 1.6–2.6)
MCH RBC QN AUTO: 31.8 PG (ref 26.8–34.3)
MCHC RBC AUTO-ENTMCNC: 33.7 G/DL (ref 31.4–37.4)
MCV RBC AUTO: 94 FL (ref 82–98)
PHOSPHATE SERPL-MCNC: 1.2 MG/DL (ref 2.7–4.5)
PHOSPHATE SERPL-MCNC: 1.4 MG/DL (ref 2.7–4.5)
PLATELET # BLD AUTO: 240 THOUSANDS/UL (ref 149–390)
PMV BLD AUTO: 9.7 FL (ref 8.9–12.7)
POTASSIUM SERPL-SCNC: 3.7 MMOL/L (ref 3.5–5.3)
POTASSIUM SERPL-SCNC: 4.8 MMOL/L (ref 3.5–5.3)
RBC # BLD AUTO: 3.77 MILLION/UL (ref 3.88–5.62)
SODIUM SERPL-SCNC: 135 MMOL/L (ref 136–145)
SODIUM SERPL-SCNC: 136 MMOL/L (ref 136–145)
WBC # BLD AUTO: 7.02 THOUSAND/UL (ref 4.31–10.16)

## 2017-11-05 PROCEDURE — 80048 BASIC METABOLIC PNL TOTAL CA: CPT | Performed by: INTERNAL MEDICINE

## 2017-11-05 PROCEDURE — 84100 ASSAY OF PHOSPHORUS: CPT | Performed by: INTERNAL MEDICINE

## 2017-11-05 PROCEDURE — 82948 REAGENT STRIP/BLOOD GLUCOSE: CPT

## 2017-11-05 PROCEDURE — 85027 COMPLETE CBC AUTOMATED: CPT | Performed by: INTERNAL MEDICINE

## 2017-11-05 PROCEDURE — 83735 ASSAY OF MAGNESIUM: CPT | Performed by: INTERNAL MEDICINE

## 2017-11-05 PROCEDURE — C9113 INJ PANTOPRAZOLE SODIUM, VIA: HCPCS | Performed by: INTERNAL MEDICINE

## 2017-11-05 RX ORDER — ALPRAZOLAM 0.25 MG/1
0.25 TABLET ORAL 3 TIMES DAILY PRN
Status: DISCONTINUED | OUTPATIENT
Start: 2017-11-05 | End: 2017-11-05

## 2017-11-05 RX ORDER — POTASSIUM CHLORIDE 14.9 MG/ML
20 INJECTION INTRAVENOUS ONCE
Status: COMPLETED | OUTPATIENT
Start: 2017-11-05 | End: 2017-11-05

## 2017-11-05 RX ORDER — ALPRAZOLAM 0.25 MG/1
0.25 TABLET ORAL 3 TIMES DAILY PRN
Status: DISPENSED | OUTPATIENT
Start: 2017-11-05 | End: 2017-11-07

## 2017-11-05 RX ORDER — MAGNESIUM SULFATE HEPTAHYDRATE 40 MG/ML
2 INJECTION, SOLUTION INTRAVENOUS ONCE
Status: COMPLETED | OUTPATIENT
Start: 2017-11-05 | End: 2017-11-05

## 2017-11-05 RX ORDER — ALPRAZOLAM 0.25 MG/1
0.25 TABLET ORAL ONCE
Status: COMPLETED | OUTPATIENT
Start: 2017-11-05 | End: 2017-11-05

## 2017-11-05 RX ORDER — NICOTINE 21 MG/24HR
21 PATCH, TRANSDERMAL 24 HOURS TRANSDERMAL DAILY
Status: DISCONTINUED | OUTPATIENT
Start: 2017-11-05 | End: 2017-11-07 | Stop reason: HOSPADM

## 2017-11-05 RX ORDER — METHADONE HYDROCHLORIDE 10 MG/1
115 TABLET ORAL DAILY
Status: DISCONTINUED | OUTPATIENT
Start: 2017-11-05 | End: 2017-11-07 | Stop reason: HOSPADM

## 2017-11-05 RX ORDER — POTASSIUM CHLORIDE 20 MEQ/1
20 TABLET, EXTENDED RELEASE ORAL ONCE
Status: COMPLETED | OUTPATIENT
Start: 2017-11-05 | End: 2017-11-05

## 2017-11-05 RX ADMIN — ONDANSETRON 4 MG: 2 INJECTION INTRAMUSCULAR; INTRAVENOUS at 06:09

## 2017-11-05 RX ADMIN — POTASSIUM CHLORIDE 20 MEQ: 1500 TABLET, EXTENDED RELEASE ORAL at 11:11

## 2017-11-05 RX ADMIN — NICOTINE 21 MG: 21 PATCH, EXTENDED RELEASE TRANSDERMAL at 13:09

## 2017-11-05 RX ADMIN — DEXTROSE AND SODIUM CHLORIDE 250 ML/HR: 5; 900 INJECTION, SOLUTION INTRAVENOUS at 01:16

## 2017-11-05 RX ADMIN — ONDANSETRON 4 MG: 2 INJECTION INTRAMUSCULAR; INTRAVENOUS at 12:42

## 2017-11-05 RX ADMIN — METHADONE HYDROCHLORIDE 115 MG: 10 TABLET ORAL at 11:12

## 2017-11-05 RX ADMIN — PANTOPRAZOLE SODIUM 40 MG: 40 INJECTION, POWDER, FOR SOLUTION INTRAVENOUS at 20:12

## 2017-11-05 RX ADMIN — ALPRAZOLAM 0.25 MG: 0.25 TABLET ORAL at 23:43

## 2017-11-05 RX ADMIN — DEXTROSE AND SODIUM CHLORIDE 250 ML/HR: 5; 900 INJECTION, SOLUTION INTRAVENOUS at 05:21

## 2017-11-05 RX ADMIN — ALPRAZOLAM 0.25 MG: 0.25 TABLET ORAL at 00:50

## 2017-11-05 RX ADMIN — MAGNESIUM SULFATE HEPTAHYDRATE 2 G: 40 INJECTION, SOLUTION INTRAVENOUS at 02:35

## 2017-11-05 RX ADMIN — POTASSIUM CHLORIDE 20 MEQ: 200 INJECTION, SOLUTION INTRAVENOUS at 00:05

## 2017-11-05 RX ADMIN — ALPRAZOLAM 0.25 MG: 0.25 TABLET ORAL at 19:40

## 2017-11-05 RX ADMIN — SODIUM PHOSPHATE, MONOBASIC, MONOHYDRATE 30 MMOL: 276; 142 INJECTION, SOLUTION INTRAVENOUS at 04:47

## 2017-11-05 RX ADMIN — CALCIUM GLUCONATE 1 G: 94 INJECTION, SOLUTION INTRAVENOUS at 03:16

## 2017-11-05 RX ADMIN — ALPRAZOLAM 0.25 MG: 0.25 TABLET ORAL at 11:12

## 2017-11-05 RX ADMIN — PANTOPRAZOLE SODIUM 40 MG: 40 INJECTION, POWDER, FOR SOLUTION INTRAVENOUS at 08:53

## 2017-11-05 RX ADMIN — SODIUM CHLORIDE 4 UNITS/HR: 9 INJECTION, SOLUTION INTRAVENOUS at 13:10

## 2017-11-05 RX ADMIN — DEXTROSE AND SODIUM CHLORIDE 125 ML/HR: 5; 900 INJECTION, SOLUTION INTRAVENOUS at 18:21

## 2017-11-05 RX ADMIN — ENOXAPARIN SODIUM 40 MG: 40 INJECTION SUBCUTANEOUS at 08:53

## 2017-11-05 RX ADMIN — POTASSIUM CHLORIDE 20 MEQ: 200 INJECTION, SOLUTION INTRAVENOUS at 02:35

## 2017-11-05 NOTE — CASE MANAGEMENT
[address] [facility] in the [facility] by [email or web address] for 2017  Network Utilization Review Department  Phone: [phone]; Fax [phone]  ATTENTION: The Network Utilization Review Department is now centralized for our 7 Facilities  Make a note that we have a new phone and fax numbers for our Department  Please call with any questions or concerns to [phone] and carefully follow the prompts so that you are directed to the right person  All voicemails are confidential  Fax any determinations, approvals, denials, and requests for initial or continue stay review clinical to [phone]  Due to HIGH CALL volume, it would be easier if you could please send faxed requests to expedite your requests and in part, help us provide discharge notifications faster  Initial Clinical Review    Admission: Date/Time/Statement: 11/4/17 @ 1933     Orders Placed This Encounter   Procedures    Inpatient Admission (expected length of stay for this patient is greater than two midnights)     Standing Status:   Standing     Number of Occurrences:   1     Order Specific Question:   Admitting Physician     Answer:   Jeanna Fabry     Order Specific Question:   Level of Care     Answer:   Level 1 Stepdown [13]     Order Specific Question:   Estimated length of stay     Answer:   More than 2 Midnights     Order Specific Question:   Certification     Answer:   I certify that inpatient services are medically necessary for this patient for a duration of greater than two midnights  See H&P and MD Progress Notes for additional information about the patient's course of treatment       ED: Date/Time/Mode of Arrival:   ED Arrival Information     Expected Arrival Acuity Means of Arrival Escorted By Service Admission Type    - 11/4/2017 16:21 Urgent Walk-In Self General Medicine Urgent    Arrival Complaint    vomiting,nausea      Chief Complaint:   Chief Complaint   Patient presents with  Abdominal Pain     Pt was here and admited for vomiting and nausea  Pt discharged two days ago and was unable to get his Levemir filled so he hasn't taken his insulin in 2 days  Pt started this AM with abd pain, nausea, and vomiting  Pt states he didn't make it to the methadone clinic today becasue he jsut didn't feel well  Pt states he hasn't had a BM in 5 days       History of Illness: 65-year-old  male admitted 10/27 2017 for DKA and discharged November 2, 2017 on Levemir insulin 30 units at bedtime in addition to Humalog sliding scale  He has been unable to fill is Levemir insulin at the pharmacy because it needs preauthorization according to the insurance  He has tried to manage with history of islet sliding scale insulin with meals but started vomiting again yesterday morning and has been unable to keep anything down so far today  He presents to the emergency room with [facility] and vomiting initially bilious but now with some coffee-ground material   He was found to be in DKA with 4+ serum acetone and urine with 3+ ketonuria  His random glucose was 298 with a bicarbonate of 12 and anion gap of 29  His arterial blood gas shows a pH of 7 09         ED Vital Signs:   ED Triage Vitals   Temperature Pulse Respirations Blood Pressure SpO2   11/04/17 1637 11/04/17 1637 11/04/17 1637 11/04/17 1637 11/04/17 1637   98 1 °F (36 7 °C) (!) 138 20 140/79 97 %      Temp Source Heart Rate Source Patient Position - Orthostatic VS BP Location FiO2 (%)   11/04/17 2119 11/05/17 0000 11/05/17 0000 11/04/17 2119 --   Tympanic Monitor Lying Left arm       Pain Score       11/04/17 1637       7        Wt Readings from Last 1 Encounters:   11/04/17 90 kg (198 lb 6 6 oz)       Vital Signs (abnormal):   11/05/17 0100 -- 97  23 133/84 103 -- -- Lying   11/05/17 0000 --  110  24 150/78 106 98 % None (Room air) Lying   11/04/17 2030 --  113  27 -- --  83 % -- --   11/04/17 2015 --  115  26 -- -- 100 % -- --   11/04/17 2000 --  122  25 160/78 109 100 % -- --   11/04/17 1945 --  122  31 -- -- 100 % -- --   11/04/17 1930 --  114  25 159/79 110 100 % -- --   11/04/17 1915 --  116  24 -- -- 100 % -- --   11/04/17 1900 --  122  26 156/81 108 100 % -- --   11/04/17 1845 --  128  28 -- -- -- -- --   11/04/17 1830 --  129  25 154/72 104 100 % -- --   11/04/17 1745 --  121  24 -- -- -- --        Abnormal Labs/Diagnostic Test Results:   11/4:  ABG1: ph 7 096   pco2 17 8   hco3 5 5   Be-22   co2 6   ABG2: ph 7 086   pco2 18 3   hco3 5 5   Be -22       Gluc 272, 232, 298, 250, 267, 308, 326, 125534, 266, 264, 211    Istat: Na 134   co2 6, 8   A gap 24   Creat  5      Na 134, 134, 135       Cl 93, 98      co2 12, 9, 10          k 5 6       A gap 29, 27,  25  Ca 8   ast 155   Alt 378   Alk phos 157   t prot 8 6   Lipase 45  Lactic acid 4 8, 5 7     Wbc 10 7  UA: sg>=1 030   Gluc 500   ket >=80   Tr bld   Tr prot   0-1 rbc   0-1 hyaline casts    11/5: gluc 216, 209, 224, 244, 207, 189, 194, 200, 180, 181, 178  co2 14, 19   A gap 17   Ca 7 2, 7 4   Phos 1 4, 1 2      hct 35 6      ED Treatment:   Medication Administration from 11/04/2017 1621 to 11/04/2017 2119       Date/Time Order Dose Route Action Action by Comments     11/04/2017 1650 ondansetron (ZOFRAN) injection 4 mg 4 mg Intravenous Given Kei Aguilar RN      11/04/2017 1713 sodium chloride 0 9 % bolus 1,000 mL 1,000 mL Intravenous [address] Kei Aguilar RN      11/04/2017 1653 sodium chloride 0 9 % bolus 1,000 mL 1,000 mL Intravenous [facility] Kei Aguilar RN      11/04/2017 1727 promethazine (PHENERGAN) injection 25 mg 25 mg Intravenous Given Kei Aguilar RN      11/04/2017 1840 methadone (DOLOPHINE) tablet 115 mg 115 mg Oral Given Kei Aguilar RN      11/04/2017 1729 dextrose 5 % and sodium chloride 0 9 % infusion 250 mL/hr Intravenous [address] Kei Aguilar RN      11/04/2017 1750 morphine (PF) 4 mg/mL injection 4 mg 4 mg Intravenous Given Niesha PEÑA Eliud Jimenes RN      11/04/2017 1737 LORazepam (ATIVAN) 2 mg/mL injection 1 mg 1 mg Intravenous Given Keyshawn Thompson RN      11/04/2017 1816 insulin regular (HumuLIN R,NovoLIN R) 1 Units/mL in sodium chloride 0 9 % 100 mL infusion 2 Units/hr Intravenous [address] Keyshawn Thompson RN      11/04/2017 1752 metoclopramide (REGLAN) injection 10 mg 10 mg Intravenous Given Keyshawn Thompson RN      11/04/2017 1915 LORazepam (ATIVAN) 2 mg/mL injection 1 mg 1 mg Intravenous Given Keyshawn Thompson RN      11/04/2017 2024 ondansetron (ZOFRAN) injection 4 mg 4 mg Intravenous Given Keyshawn Thompson RN           Past Medical/Surgical History: Active Ambulatory Problems     Diagnosis Date Noted    Type 1 diabetes mellitus ([facility] ) 07/03/2017    Narcotic dependency, continuous ([facility] ) 07/03/2017    Gastroenteritis, acute 07/03/2017    Hypoglycemia 07/03/2017    Diabetic ketoacidosis without coma associated with [facility] 10/28/2017    Constipation 10/28/2017     Resolved Ambulatory Problems     Diagnosis Date Noted    Colitis 10/28/2017    Vomiting, persistent, in adult 10/28/2017    Hyperglycemia 10/28/2017     Past Medical History:   Diagnosis Date    Diabetes mellitus [facility]     Narcotic abuse        Admitting Diagnosis: Anxiety [F41 9]  Nausea [R11 0]  Vomiting [R11 10]  Constipation [K59 00]  Narcotic dependency, continuous ([facility] ) [F11 20]  Diabetic ketoacidosis without coma associated with type 1 diabetes mellitus (HCC) [E10 10]  Type 1 diabetes mellitus with ketoacidotic coma ([facility] ) [E10 11]    Age/Sex: 46 y o  male     Assessment:  Diabetic ketoacidosis  Intractable vomiting  Medication non adherence  Methadone dependence  Fatty liver      Plan:  Admitted to intensive care unit status all diabetic ketoacidosis protocol with continuous IV insulin drip with hourly Accu-Cheks and serial chemistry monitoring Q 4 hourly    Continue aggressive fluid resuscitation/replacement with normal saline and transition to dextrose saline when Accu-Chek is under 250      Intractable vomiting  Continue aggressive fluid resuscitation and start on intravenous Protonix 40 mg q 12 hours hourly  Keep nil by mouth for now commence trial of clear liquids in the morning  Replace electrolytes as needed  Monitor intake and output      Medication nonadherence  Consult /case management to navigate problems with his Levemir insulin authorization      Methadone dependence  Takes 115 mg of methadone daily  Admission Orders:  Scheduled Meds:   enoxaparin 40 mg Subcutaneous Q24H [address]   methadone 115 mg Oral Daily   nicotine 21 mg Transdermal Daily   pantoprazole 40 mg Intravenous Q12H [address]     Continuous Infusions:   dextrose 5 % and sodium chloride 0 9 % 125 mL/hr Last Rate: 125 mL/hr (11/05/17 1015)   insulin regular (HumuLIN R,NovoLIN R) infusion 0 1-30 Units/hr Last Rate: 4 Units/hr (11/05/17 1310)     PRN Meds:   ALPRAZolam    Ondansetron (IV x 2)    Promethazine (IV x 2)  IV ca gluc x 1  IV mg x 1  IV kcl x 2  1li NS bolus x 1  IV na phos x 1    CLEAR LIQUIDS  Electrolyte replacement protocols for mg, phos, k  Tele  I/O  Bmp, cmp in am  Cons case mgmt      PER MED 11/5:  Assessment/Plan:  1-diabetic ketoacidosis secondary to missing his insulin due insurance requiring pre authorization for Levemir -patient is still acidotic with an increased gap and very nauseous  Patient on an insulin drip -blood sugars are trending down  Plan to continue insulin drip through today and transition him to 70/30 insulin dose from tomorrow     Continue IV fluids as well as IV Zofran  Accu-Cheks  every 4 hours    2-intractable nausea and vomiting-secondary to 1+ gastroparesis    Continue Zofran , IV fluids and keep patient on clears for now    3-Transaminitis-ultrasound done during last admission revealed fatty liver--the patient is to follow up with GI as an outpatient    4-history of opiate abuse-on methadone 115 mg daily    5-anxiety-on Xanax   Case management consulted for his insulin        Subjective:  Patient was discharged 2 days ago after being treated for diabetic ketoacidosis on Levemir however patient did not get his levimir secondary to insurance needing a preauth  He has been readmitted with diabetic ketoacidosis  Patient is still very nauseous and acidotic -even though blood sugars are trending down will continue him on the insulin drip for today  And hopefully transition to a long-acting/short-acting preparation from 11/06/2017   A good option for him might be RELION which is the [employer] brand of 70/30 insulin which costs around 30 dollars a month

## 2017-11-05 NOTE — ED NOTES
Patient vomited 800 mls of coffee ground liquid  prrovider aware        Nell Balderas RN  11/04/17 2027

## 2017-11-05 NOTE — PROGRESS NOTES
Progress Note - Jeff Villarreal 46 y o  male MRN: [id number]    Unit/Bed#: -02 Encounter: [id number]      Assessment/Plan:  1-diabetic ketoacidosis secondary to missing his insulin due insurance requiring pre authorization for Levemir -patient is still acidotic with an increased gap and very nauseous  Patient on an insulin drip -blood sugars are trending down  Plan to continue insulin drip through today and transition him to 70/30 insulin dose from tomorrow     Continue IV fluids as well as IV Zofran  Accu-Cheks  every 4 hours  2-intractable nausea and vomiting-secondary to 1+ gastroparesis  Continue Zofran , IV fluids and keep patient on clears for now  3-Transaminitis-ultrasound done during last admission revealed fatty liver--the patient is to follow up with GI as an outpatient  4-history of opiate abuse-on methadone 115 mg daily       5-anxiety-on Xanax    Case management consulted for his insulin   Subjective:  Patient was discharged 2 days ago after being treated for diabetic ketoacidosis on Levemir however patient did not get his liver meals secondary to insurance needing a preop  He has been readmitted with diabetic ketoacidosis  Patient is still very nauseous and acidotic -even though blood sugars are trending down will continue him on the insulin drip for today  And hopefully transition to a long-acting/short-acting preparation from 11/06/2017  A good option for him might be RELION which is the [employer] brand of 70/30 insulin which costs around 30 dollars a month    Physical Exam:   Vitals: Blood pressure 123/74, pulse 87, temperature 99 2 °F (37 3 °C), temperature source Oral, resp  rate 15, height 6' (1 829 m), weight 90 kg (198 lb 6 6 oz), SpO2 98 %  ,Body mass index is 26 91 kg/m²  Gen:  Pleasant, non-tachypnic, non-dyspnic  Conversant  Heart: regular rate and rhythm, S1S2 present, no murmur, rub or gallop  Lungs: clear to ausculatation bilaterally    No wheezing, crackless, or rhonchi  No accessory muscle use or respiratory distress  Abd: soft, non-tender, non-distended  NABS, no guarding, rebound or peritoneal signs  Extremities: no clubbing, cyanosis or edema  2+pedal pulses bilaterally  Full range of motion  Neuro: awake, alert and oriented  Cranial nerves 2-12 intact  Strength and sensation grossly intact  Skin: warm and dry: no petechiae, purpura and rash      LABS:     Results from last 7 days  Lab Units 11/05/17  0601 11/04/17  2038 11/04/17  1700 11/01/17  0527   WBC Thousand/uL 7 02  --  10 70* 4 60   HEMOGLOBIN g/dL 12 0  --  15 5 15 2   I STAT HEMOGLOBIN g/dl  --  16 0  --   --    HEMATOCRIT % 35 6*  --  46 3 44 4   PLATELETS Thousands/uL 240  --  287 155       Results from last 7 days  Lab Units 11/05/17  0601 11/05/17  0101 11/04/17  2212   SODIUM mmol/L 135* 136 135*   POTASSIUM mmol/L 3 7 4 8 5 1   CHLORIDE mmol/L 104 105 100   CO2 mmol/L 19* 14* 10*   BUN mg/dL 8 9 13   CREATININE mg/dL 0 80 0 93 0 89   GLUCOSE RANDOM mg/dL 200* 221* 264*   CALCIUM mg/dL 7 4* 7 2* 7 8*       Intake/Output Summary (Last 24 hours) at 11/05/17 1015  Last data filed at 11/05/17 0614   Gross per 24 hour   Intake          5808 33 ml   Output             4400 ml   Net          1408 33 ml           enoxaparin 40 mg Subcutaneous Q24H [address]   methadone 115 mg Oral Daily   pantoprazole 40 mg Intravenous Q12H [address]   potassium chloride 20 mEq Oral Once   sodium phosphate 30 mmol Intravenous Once

## 2017-11-05 NOTE — H&P
H&P Exam - Mahnaz Rouse 46 y o  male MRN: [id number]    Unit/Bed#: ED 08 Encounter: [id number]    Assessment:  Diabetic ketoacidosis  Intractable vomiting  Medication non adherence  Methadone dependence  Fatty liver  Plan:  Admitted to intensive care unit status all diabetic ketoacidosis protocol with continuous IV insulin drip with hourly Accu-Cheks and serial chemistry monitoring Q 4 hourly  Continue aggressive fluid resuscitation/replacement with normal saline and transition to dextrose saline when Accu-Chek is under 250  Intractable vomiting  Continue aggressive fluid resuscitation and start on intravenous Protonix 40 mg q 12 hours hourly  Keep nil by mouth for now commence trial of clear liquids in the morning  Replace electrolytes as needed  Monitor intake and output  Medication nonadherence  Consult /case management to navigate problems with his Levemir insulin authorization  Methadone dependence  Takes 115 mg of methadone daily  History of Present Illness      44-year-old  male admitted 10/27 2017 for DKA and discharged November 2, 2017 on Levemir insulin 30 units at bedtime in addition to Humalog sliding scale  He has been unable to fill is Levemir insulin at the pharmacy because it needs preauthorization according to the insurance  He has tried to manage with history of islet sliding scale insulin with meals but started vomiting again yesterday morning and has been unable to keep anything down so far today  He presents to the emergency room with [facility] and vomiting initially bilious but now with some coffee-ground material   He was found to be in DKA with 4+ serum acetone and urine with 3+ ketonuria  His random glucose was 298 with a bicarbonate of 12 and anion gap of 29  His arterial blood gas shows a pH of 7 09  I was asked to evaluate for admission  Review of Systems   Constitutional: Positive for appetite change  HENT: Negative      Eyes: Negative  Respiratory: Negative  Cardiovascular: Negative  Gastrointestinal: Positive for abdominal pain and constipation  Endocrine: Negative  Genitourinary: Negative  Musculoskeletal: Negative  Skin: Negative  Allergic/Immunologic: Negative  Neurological: Negative  Hematological: Negative  Psychiatric/Behavioral: Negative  Historical Information   Past Medical History:   Diagnosis Date    Diabetes mellitus ([address] )     Narcotic abuse      Past Surgical History:   Procedure Laterality Date    ESOPHAGOGASTRODUODENOSCOPY N/A 10/30/2017    Procedure: ESOPHAGOGASTRODUODENOSCOPY (EGD); Surgeon: Jessica Flood MD;  Location: [facility] OR;  Service: Gastroenterology     Social History   History   Alcohol Use    Yes     Comment: Socially     History   Drug Use No     History   Smoking Status    Current Every Day Smoker    Packs/day: 0 20    Years: 10 00    Types: Cigarettes   Smokeless Tobacco    Never Used     Comment: 4-5 cigarettes/day     Family History: non-contributory  Father is alive at age 71 without medical problems  Mother  when he was young      Meds/Allergies   all medications and allergies reviewed  No Known Allergies    Objective   First Vitals:   Blood Pressure: 140/79 (17)  Pulse: (!) 138 (17)  Temperature: 98 1 °F (36 7 °C) (17)  Respirations: 20 (17)  Weight - Scale: 89 4 kg (197 lb) (17)  SpO2: 97 % (17)    Current Vitals:   Blood Pressure: 160/78 (17)  Pulse: (!) 113 (17)  Temperature: 98 1 °F (36 7 °C) (17)  Respirations: (!) 27 (17)  Weight - Scale: 89 4 kg (197 lb) (17)  SpO2: 100 % (17)      Intake/Output Summary (Last 24 hours) at 17  Last data filed at 17   Gross per 24 hour   Intake             1000 ml   Output             1700 ml   Net             -700 ml       Invasive Devices     Peripheral Intravenous Line            Peripheral IV 11/04/17 Left Antecubital less than 1 day    Peripheral IV 11/04/17 Right Antecubital less than 1 day                Physical Exam young  male ill looking with intermittent episodes of vomiting  He is normocephalic atraumatic, afebrile, not clinically pale, anicteric, mary rn mucosa are dry  His neck is supple without distended veins or carotid bruit  Lungs are clear to auscultation  Heart sounds 1 and 2 and regular with tachycardia  Abdomen is scaphoid with lighted gastric tenderness bowel sounds are normoactive  Musculoskeletal system/extremities  He has no pedal edema his distal pulses are present and equal   CNS exam   He is alert and oriented x3 without focal neurological deficit  Lab Results:  Sodium 134 potassium 4 6 chloride 93 by CABG 12 BUN 14 creatinine 1 14   and phosphate is 157 lactic acid is 5 7, total bilirubin 0 9 albumin 4 8 magnesium 2 1  Urinalysis with glycosuria, 3+ ketones, specific gravity greater than 1 030  WBC 10 7, hemoglobin 15 5 hematocrit 46 3 MCV 97 today count 287 and lipase 45  Troponin less than 0 02, CK-MB 1 1, total   Coagulation profile is normal   ABG with pH 7 086, pCO2 18 3, PO2o 116, bicarb 5 5, O2 sat 97% on room air  Imaging:  Not applicable  EKG, Pathology, and Other Studies:  Telemetry with sinus tachycardia  Code Status: Prior  He is a full code  Advance Directive and Living Will:      Power of :    POLST:      Counseling / Coordination of Care:   Patient informed of consult /case management to help with navigating authorization for his Levemir insulin  At least 45 minutes was spent in evaluation and care coordination    For admission to intensive care unit anticipate at least 2 midnight stay in the hospital

## 2017-11-05 NOTE — PROGRESS NOTES
Pt c/o feeling anxious  BG checked and Insulin gtt adjusted per Dr Cristin Lee order  It is not time for a dose of Xanax yet  Pt offered food/fluids/ambulation/TV/music  Patient refused anything but wants another dose of Xanax  This nurse notified MD who does not want to increase Xanax order at this time d/t patient's h/o drug abuse  Pt instructed of this  Encouraged to try something else to relieve anxiety at which point pt became quite irritable  Pt then instructed to notify nurse if there is anything else nurse can provide/do for pt  Will cont to monitor and promote relaxation

## 2017-11-05 NOTE — PLAN OF CARE
Problem: PAIN - ADULT  Goal: Verbalizes/displays adequate comfort level or baseline comfort level  Interventions:  - Encourage patient to monitor pain and request assistance  - Assess pain using appropriate pain scale  - Administer analgesics based on type and severity of pain and evaluate response  - Implement non-pharmacological measures as appropriate and evaluate response  - Consider cultural and social influences on pain and pain management  - Notify physician/advanced practitioner if interventions unsuccessful or patient reports new pain  Outcome: Not Progressing      Problem: INFECTION - ADULT  Goal: Absence or prevention of progression during hospitalization  INTERVENTIONS:  - Assess and monitor for signs and symptoms of infection  - Monitor lab/diagnostic results  - Monitor all insertion sites, i e  indwelling lines, tubes, and drains  - Monitor endotracheal (as able) and nasal secretions for changes in amount and color  - [place] appropriate cooling/warming therapies per order  - Administer medications as ordered  - Instruct and encourage patient and family to use good hand hygiene technique  - Identify and instruct in appropriate isolation precautions for identified infection/condition  Outcome: Not Progressing      Problem: SAFETY ADULT  Goal: Patient will remain free of falls  INTERVENTIONS:  - Assess patient frequently for physical needs  -  Identify cognitive and physical deficits and behaviors that affect risk of falls    -  [place] fall precautions as indicated by assessment   - Educate patient/family on patient safety including physical limitations  - Instruct patient to call for assistance with activity based on assessment  - Modify environment to reduce risk of injury  - Consider OT/PT consult to assist with strengthening/mobility  Outcome: Not Progressing    Goal: Maintain or return to baseline ADL function  INTERVENTIONS:  -  Assess patient's ability to carry out ADLs; assess patient's baseline for ADL function and identify physical deficits which impact ability to perform ADLs (bathing, care of mouth/teeth, toileting, grooming, dressing, etc )  - Assess/evaluate cause of self-care deficits   - Assess range of motion  - Assess patient's mobility; develop plan if impaired  - Assess patient's need for assistive devices and provide as appropriate  - Encourage maximum independence but intervene and supervise when necessary  ¯ Involve family in performance of ADLs  ¯ Assess for home care needs following discharge   ¯ Request OT consult to assist with ADL evaluation and planning for discharge  ¯ Provide patient education as appropriate  Outcome: Not Progressing    Goal: Maintain or return mobility status to optimal level  INTERVENTIONS:  - Assess patient's baseline mobility status (ambulation, transfers, stairs, etc )    - Identify cognitive and physical deficits and behaviors that affect mobility  - Identify mobility aids required to assist with transfers and/or ambulation (gait belt, sit-to-stand, lift, walker, cane, etc )  - [place] fall precautions as indicated by assessment  - Record patient progress and toleration of activity level on Mobility SBAR; progress patient to next Phase/Stage  - Instruct patient to call for assistance with activity based on assessment  - Request Rehabilitation consult to assist with strengthening/weightbearing, etc   Outcome: Not Progressing      Problem: DISCHARGE PLANNING  Goal: Discharge to home or other facility with appropriate resources  INTERVENTIONS:  - Identify barriers to discharge w/patient and caregiver  - Arrange for needed discharge resources and transportation as appropriate  - Identify discharge learning needs (meds, wound care, etc )  - Arrange for interpretive services to assist at discharge as needed  - Refer to Case Management Department for coordinating discharge planning if the patient needs post-hospital services based on physician/advanced practitioner order or complex needs related to functional status, cognitive ability, or social support system  Outcome: Not Progressing      Problem: Knowledge Deficit  Goal: Patient/family/caregiver demonstrates understanding of disease process, treatment plan, medications, and discharge instructions  Complete learning assessment and assess knowledge base    Interventions:  - Provide teaching at level of understanding  - Provide teaching via preferred learning methods  Outcome: Not Progressing      Problem: METABOLIC, FLUID AND ELECTROLYTES - ADULT  Goal: Electrolytes maintained within normal limits  INTERVENTIONS:  - Monitor labs and assess patient for signs and symptoms of electrolyte imbalances  - Administer electrolyte replacement as ordered  - Monitor response to electrolyte replacements, including repeat lab results as appropriate  - Instruct patient on fluid and nutrition as appropriate  Outcome: Not Progressing    Goal: Fluid balance maintained  INTERVENTIONS:  - Monitor labs and assess for signs and symptoms of volume excess or deficit  - Monitor I/O and WT  - Instruct patient on fluid and nutrition as appropriate  Outcome: Not Progressing    Goal: Glucose maintained within target range  INTERVENTIONS:  - Monitor Blood Glucose as ordered  - Assess for signs and symptoms of hyperglycemia and hypoglycemia  - Administer ordered medications to maintain glucose within target range  - Assess nutritional intake and initiate nutrition service referral as needed  Outcome: Not Progressing

## 2017-11-06 PROBLEM — E87.6 HYPOKALEMIA: Status: ACTIVE | Noted: 2017-11-06

## 2017-11-06 LAB
ALBUMIN SERPL BCP-MCNC: 2.9 G/DL (ref 3.5–5)
ALP SERPL-CCNC: 83 U/L (ref 46–116)
ALT SERPL W P-5'-P-CCNC: 173 U/L (ref 12–78)
ANION GAP SERPL CALCULATED.3IONS-SCNC: 10 MMOL/L (ref 4–13)
ANION GAP SERPL CALCULATED.3IONS-SCNC: 10 MMOL/L (ref 4–13)
AST SERPL W P-5'-P-CCNC: 61 U/L (ref 5–45)
BILIRUB SERPL-MCNC: 0.5 MG/DL (ref 0.2–1)
BUN SERPL-MCNC: 2 MG/DL (ref 5–25)
BUN SERPL-MCNC: 3 MG/DL (ref 5–25)
CALCIUM SERPL-MCNC: 7.7 MG/DL (ref 8.3–10.1)
CALCIUM SERPL-MCNC: 8.2 MG/DL (ref 8.3–10.1)
CHLORIDE SERPL-SCNC: 103 MMOL/L (ref 100–108)
CHLORIDE SERPL-SCNC: 109 MMOL/L (ref 100–108)
CO2 SERPL-SCNC: 22 MMOL/L (ref 21–32)
CO2 SERPL-SCNC: 25 MMOL/L (ref 21–32)
CREAT SERPL-MCNC: 0.62 MG/DL (ref 0.6–1.3)
CREAT SERPL-MCNC: 0.75 MG/DL (ref 0.6–1.3)
GFR SERPL CREATININE-BSD FRML MDRD: 116 ML/MIN/1.73SQ M
GFR SERPL CREATININE-BSD FRML MDRD: 125 ML/MIN/1.73SQ M
GLUCOSE SERPL-MCNC: 115 MG/DL (ref 65–140)
GLUCOSE SERPL-MCNC: 124 MG/DL (ref 65–140)
GLUCOSE SERPL-MCNC: 136 MG/DL (ref 65–140)
GLUCOSE SERPL-MCNC: 138 MG/DL (ref 65–140)
GLUCOSE SERPL-MCNC: 156 MG/DL (ref 65–140)
GLUCOSE SERPL-MCNC: 178 MG/DL (ref 65–140)
GLUCOSE SERPL-MCNC: 220 MG/DL (ref 65–140)
GLUCOSE SERPL-MCNC: 250 MG/DL (ref 65–140)
GLUCOSE SERPL-MCNC: 324 MG/DL (ref 65–140)
GLUCOSE SERPL-MCNC: 93 MG/DL (ref 65–140)
POTASSIUM SERPL-SCNC: 3.1 MMOL/L (ref 3.5–5.3)
POTASSIUM SERPL-SCNC: 3.6 MMOL/L (ref 3.5–5.3)
PROT SERPL-MCNC: 5.6 G/DL (ref 6.4–8.2)
SODIUM SERPL-SCNC: 138 MMOL/L (ref 136–145)
SODIUM SERPL-SCNC: 141 MMOL/L (ref 136–145)

## 2017-11-06 PROCEDURE — C9113 INJ PANTOPRAZOLE SODIUM, VIA: HCPCS | Performed by: INTERNAL MEDICINE

## 2017-11-06 PROCEDURE — 80053 COMPREHEN METABOLIC PANEL: CPT | Performed by: NURSE PRACTITIONER

## 2017-11-06 PROCEDURE — 82948 REAGENT STRIP/BLOOD GLUCOSE: CPT

## 2017-11-06 PROCEDURE — 80048 BASIC METABOLIC PNL TOTAL CA: CPT | Performed by: INTERNAL MEDICINE

## 2017-11-06 RX ORDER — INSULIN GLARGINE 100 [IU]/ML
30 INJECTION, SOLUTION SUBCUTANEOUS
Status: DISCONTINUED | OUTPATIENT
Start: 2017-11-06 | End: 2017-11-07 | Stop reason: HOSPADM

## 2017-11-06 RX ORDER — POTASSIUM CHLORIDE 20 MEQ/1
20 TABLET, EXTENDED RELEASE ORAL 2 TIMES DAILY WITH MEALS
Status: DISCONTINUED | OUTPATIENT
Start: 2017-11-06 | End: 2017-11-07 | Stop reason: HOSPADM

## 2017-11-06 RX ORDER — DOCUSATE SODIUM 100 MG/1
100 CAPSULE, LIQUID FILLED ORAL 2 TIMES DAILY
Status: DISCONTINUED | OUTPATIENT
Start: 2017-11-06 | End: 2017-11-07 | Stop reason: HOSPADM

## 2017-11-06 RX ORDER — POTASSIUM CHLORIDE 14.9 MG/ML
20 INJECTION INTRAVENOUS ONCE
Status: COMPLETED | OUTPATIENT
Start: 2017-11-06 | End: 2017-11-06

## 2017-11-06 RX ADMIN — INSULIN LISPRO 8 UNITS: 100 INJECTION, SOLUTION INTRAVENOUS; SUBCUTANEOUS at 12:07

## 2017-11-06 RX ADMIN — INSULIN DETEMIR 15 UNITS: 100 INJECTION, SOLUTION SUBCUTANEOUS at 09:00

## 2017-11-06 RX ADMIN — POTASSIUM CHLORIDE 20 MEQ: 200 INJECTION, SOLUTION INTRAVENOUS at 09:25

## 2017-11-06 RX ADMIN — INSULIN LISPRO 1 UNITS: 100 INJECTION, SOLUTION INTRAVENOUS; SUBCUTANEOUS at 09:00

## 2017-11-06 RX ADMIN — INSULIN LISPRO 2 UNITS: 100 INJECTION, SOLUTION INTRAVENOUS; SUBCUTANEOUS at 17:23

## 2017-11-06 RX ADMIN — NICOTINE 21 MG: 21 PATCH, EXTENDED RELEASE TRANSDERMAL at 08:39

## 2017-11-06 RX ADMIN — POTASSIUM CHLORIDE 20 MEQ: 1500 TABLET, EXTENDED RELEASE ORAL at 17:24

## 2017-11-06 RX ADMIN — DEXTROSE AND SODIUM CHLORIDE 125 ML/HR: 5; 900 INJECTION, SOLUTION INTRAVENOUS at 02:40

## 2017-11-06 RX ADMIN — ENOXAPARIN SODIUM 40 MG: 40 INJECTION SUBCUTANEOUS at 08:30

## 2017-11-06 RX ADMIN — ALPRAZOLAM 0.25 MG: 0.25 TABLET ORAL at 22:54

## 2017-11-06 RX ADMIN — PANTOPRAZOLE SODIUM 40 MG: 40 INJECTION, POWDER, FOR SOLUTION INTRAVENOUS at 21:24

## 2017-11-06 RX ADMIN — INSULIN LISPRO 8 UNITS: 100 INJECTION, SOLUTION INTRAVENOUS; SUBCUTANEOUS at 08:54

## 2017-11-06 RX ADMIN — INSULIN GLARGINE 30 UNITS: 100 INJECTION, SOLUTION SUBCUTANEOUS at 21:23

## 2017-11-06 RX ADMIN — POTASSIUM CHLORIDE 20 MEQ: 1500 TABLET, EXTENDED RELEASE ORAL at 09:23

## 2017-11-06 RX ADMIN — DOCUSATE SODIUM 100 MG: 100 CAPSULE, LIQUID FILLED ORAL at 17:24

## 2017-11-06 RX ADMIN — ALPRAZOLAM 0.25 MG: 0.25 TABLET ORAL at 10:38

## 2017-11-06 RX ADMIN — INSULIN LISPRO 8 UNITS: 100 INJECTION, SOLUTION INTRAVENOUS; SUBCUTANEOUS at 17:23

## 2017-11-06 RX ADMIN — PANTOPRAZOLE SODIUM 40 MG: 40 INJECTION, POWDER, FOR SOLUTION INTRAVENOUS at 08:31

## 2017-11-06 RX ADMIN — METHADONE HYDROCHLORIDE 115 MG: 10 TABLET ORAL at 08:27

## 2017-11-06 RX ADMIN — ALPRAZOLAM 0.25 MG: 0.25 TABLET ORAL at 17:31

## 2017-11-06 NOTE — PROGRESS NOTES
Progress Note - Narendra Whaley 46 y o  male MRN: [id number]    Unit/Bed#: -02 Encounter: [id number]      As per discussion with case management-Lantus or toujeo are preferred long-acting insulin for his plan-has switch from Levemir to Lantus 30 units for HS dosing    Dillan Jacinto, DO

## 2017-11-06 NOTE — CONSULTS
Consultation - Shelly Rothman 46 y o  male MRN: [id number]    Unit/Bed#: -02 Encounter: [id number]      Assessment/Plan     Assessment:  DM 1 with DKA, hyperglycemia and long term insulin use    Plan:  DKA has resolved  Transitioned to subQ  Continue to monitor on Lantus 30 units qhs (first dose of this dose is tonight), Humalog 8 units ac plus scale  Patient to establish care with our Endocrinology office  Left contact info to schedule appointment  Continue to monitor and adjust regimen as needed  Check TSH  Encouraged him to rotate injection sites when discharged home  Will need further routine work up such as urine microalb/cr, ophth exam, etc as outpatient  CC: Diabetes Consult    History of Present Illness     HPI: Shelly Rothman is a 46y o  year old male with type 1 diabetes for 10 years  He is on insulin at home (30 units of Levemir daily and sliding scale of fast acting insulin)  He denies any polyuria, polydipsia, nocturia and blurry vision  He does report nausea, vomiting, abdominal pain which is improved as DKA resolved  He denies neuropathy, nephropathy, retinopathy and heart attack  He denies any hypoglycemia  Consults    Review of Systems   Constitutional: Negative for appetite change, chills and fever  HENT: Negative for congestion and trouble swallowing  Eyes: Negative for visual disturbance  Respiratory: Negative for shortness of breath  Cardiovascular: Negative for chest pain, palpitations and leg swelling  Gastrointestinal: Positive for abdominal pain, nausea and vomiting  Negative for constipation and diarrhea  Endocrine: Negative for polydipsia and polyuria  Genitourinary: Negative for difficulty urinating and frequency  Musculoskeletal: Negative for arthralgias  Skin: Negative for rash  Neurological: Negative for dizziness and weakness  Psychiatric/Behavioral: Negative for agitation and confusion         Historical Information   Past Medical History:   Diagnosis Date    Diabetes mellitus ([facility] [address] )     Narcotic abuse      Past Surgical History:   Procedure Laterality Date    ESOPHAGOGASTRODUODENOSCOPY N/A 10/30/2017    Procedure: ESOPHAGOGASTRODUODENOSCOPY (EGD);   Surgeon: Josh Rae MD;  Location: [facility] OR;  Service: Gastroenterology     Social History   History   Alcohol Use    Yes     Comment: Socially     History   Drug Use No     History   Smoking Status    Current Every Day Smoker    Packs/day: 0 20    Years: 10 00    Types: Cigarettes   Smokeless Tobacco    Never Used     Comment: 4-5 cigarettes/day     Family History:   Family History   Problem Relation Age of Onset    Family history unknown: Yes       Meds/Allergies   Current Facility-Administered Medications   Medication Dose Route Frequency Provider Last Rate Last Dose    ALPRAZolam (XANAX) tablet 0 25 mg  0 25 mg Oral TID PRN Pricila Kelly MD   0 25 mg at 11/06/17 1038    enoxaparin (LOVENOX) subcutaneous injection 40 mg  40 mg Subcutaneous Q24H Bk Galindo MD   40 mg at 11/06/17 0830    insulin glargine (LANTUS) subcutaneous injection 30 Units  30 Units Subcutaneous HS Sarah Fly, DO        insulin lispro (HumaLOG) 100 units/mL subcutaneous injection 1-5 Units  1-5 Units Subcutaneous TID AC Sarah Fly, DO   1 Units at 11/06/17 0900    insulin lispro (HumaLOG) 100 units/mL subcutaneous injection 1-5 Units  1-5 Units Subcutaneous HS Sarah Fly, DO        insulin lispro (HumaLOG) 100 units/mL subcutaneous injection 8 Units  8 Units Subcutaneous TID With Meals Sarah Fly, DO   8 Units at 11/06/17 1207    methadone (DOLOPHINE) tablet 115 mg  115 mg Oral Daily Pricila Kelly MD   115 mg at 11/06/17 0827    nicotine (NICODERM CQ) 21 mg/24 hr TD 24 hr patch 21 mg  21 mg Transdermal Daily Pricila Kelly MD   21 mg at 11/06/17 0839    ondansetron (ZOFRAN) injection 4 mg  4 mg Intravenous Q4H PRN Apple Espino MD   4 mg at 11/05/17 1242    pantoprazole (PROTONIX) injection 40 mg  40 mg Intravenous Q12H [address] Angie Gonzales MD   40 mg at 11/06/17 0831    potassium chloride (K-DUR,KLOR-CON) CR tablet 20 mEq  20 mEq Oral BID With Meals Tristian Dasia, DO   20 mEq at 11/06/17 0923    promethazine (PHENERGAN) injection 25 mg  25 mg Intravenous Q4H PRN Angie Gonzales MD   25 mg at 11/04/17 2220    sodium chloride (PF) 0 9 % injection 3 mL  3 mL Intravenous PRN ALESSIO Nolen         No Known Allergies    Objective   Vitals: Blood pressure 142/82, pulse 98, temperature 98 °F (36 7 °C), temperature source Tympanic, resp  rate 15, height 6' (1 829 m), weight 90 3 kg (199 lb 1 2 oz), SpO2 96 %  Intake/Output Summary (Last 24 hours) at 11/06/17 1505  Last data filed at 11/06/17 1101   Gross per 24 hour   Intake             1800 ml   Output             3875 ml   Net            -2075 ml     Invasive Devices     Peripheral Intravenous Line            Peripheral IV 11/04/17 Right Antecubital 1 day                Physical Exam   Constitutional: He is oriented to person, place, and time  He appears well-developed and well-nourished  No distress  HENT:   Head: Normocephalic and atraumatic  Mouth/Throat: No oropharyngeal exudate  Eyes: Conjunctivae and EOM are normal  Pupils are equal, round, and reactive to light  No scleral icterus  Neck: Normal range of motion  Neck supple  No thyromegaly present  Cardiovascular: Normal rate and regular rhythm  No murmur heard  Pulmonary/Chest: Effort normal and breath sounds normal  No respiratory distress  He has no wheezes  Abdominal: Soft  Bowel sounds are normal  He exhibits no distension  There is no tenderness  Musculoskeletal: Normal range of motion  He exhibits no edema  Lymphadenopathy:     He has no cervical adenopathy  Neurological: He is alert and oriented to person, place, and time  He exhibits normal muscle tone  Skin: Skin is warm and dry  No rash noted  He is not diaphoretic     Psychiatric: He has a normal mood and affect  His behavior is normal        The history was obtained from the review of the chart, patient  Lab Results:       Lab Results   Component Value Date    WBC 7 02 11/05/2017    HGB 12 0 11/05/2017    HCT 35 6 (L) 11/05/2017    MCV 94 11/05/2017     11/05/2017     Lab Results   Component Value Date/Time    BUN 3 (L) 11/06/2017 04:43 AM     11/06/2017 04:43 AM    K 3 1 (L) 11/06/2017 04:43 AM     (H) 11/06/2017 04:43 AM    CO2 22 11/06/2017 04:43 AM    CREATININE 0 62 11/06/2017 04:43 AM    AST 61 (H) 11/06/2017 04:43 AM     (H) 11/06/2017 04:43 AM     No results for input(s): CHOL, HDL, LDL, TRIG, VLDL in the last 72 hours  No results found for: Jennifer Kennedi  POC Glucose (mg/dl)   Date Value   11/06/2017 178 (H)   11/06/2017 324 (H)   11/06/2017 156 (H)   11/06/2017 93   11/06/2017 124   11/06/2017 115   11/05/2017 136   11/05/2017 239 (H)   11/05/2017 330 (H)   11/05/2017 210 (H)       Imaging Studies: I have personally reviewed pertinent reports  From prior admission showing hepatic steatosis  CT abdomen pelvis with contrast [[id number]] Collected: 10/28/17 0513   Order Status: Completed Updated: 10/28/17 0517   Narrative:     CT ABDOMEN AND PELVIS WITH IV CONTRAST    INDICATION:  Right upper quadrant pain   Vomiting  COMPARISON: None  TECHNIQUE:  CT examination of the abdomen and pelvis was performed      Reformatted images were created in axial, sagittal, and coronal planes       Radiation dose length product (DLP) for this visit:  490 mGy-cm    This examination, like all CT scans performed in the [facility], was performed utilizing techniques to minimize radiation dose exposure, including the use of iterative   reconstruction and automated exposure control  IV Contrast:  50 mL of iohexol (OMNIPAQUE)       Enteric Contrast:  Enteric contrast was not administered      FINDINGS:    ABDOMEN    LOWER CHEST:  No significant abnormalities identified in the lower chest     LIVER/BILIARY TREE:  The liver is markedly hypodense consistent with fatty infiltration  GALLBLADDER:  No calcified gallstones  No pericholecystic inflammatory change  SPLEEN:  There are calcified granulomas in the spleen   Otherwise unremarkable  PANCREAS:  Unremarkable  ADRENAL GLANDS:  Unremarkable  KIDNEYS/URETERS:  Unremarkable  No hydronephrosis  STOMACH AND BOWEL:  Unremarkable  APPENDIX:  No findings to suggest appendicitis  ABDOMINOPELVIC CAVITY:  No ascites or free intraperitoneal air  No lymphadenopathy  VESSELS:  Unremarkable for patient's age  PELVIS    REPRODUCTIVE ORGANS:  Unremarkable for patient's age  URINARY BLADDER:  Unremarkable  ABDOMINAL WALL/INGUINAL REGIONS:  Unremarkable  OSSEOUS STRUCTURES:  No acute fracture or destructive osseous lesion  Impression:       No acute intra-abdominal abnormality  Marked hepatic steatosis

## 2017-11-06 NOTE — PROGRESS NOTES
Margarita Jason notified about BS of 93  RN instructed to hold gtt for two hours, recheck and let the day team make decision regarding levimir vs  70/30  Will pass in report

## 2017-11-06 NOTE — PROGRESS NOTES
Progress Note - Rufino Londono 46 y o  male MRN: [id number]    Unit/Bed#: -02 Encounter: [id number]      Assessment:  Principal Problem:    Diabetic ketoacidosis without coma associated with type 1 diabetes mellitus ([facility] [address] )  Active Problems:    Narcotic dependency, continuous ([facility] [address] )    Fatty liver    Hypokalemia  Resolved Problems:    * No resolved hospital problems  *        Plan:  1  DKA-due to lack of insulin with insurance issues on coverage- resolved now will restart Levemir 15 units this morning and then his usual 30 units tonight-will consult Endocrinology as he has moved to this area would like to establish with a group locally-if he remains stable will plan for discharge tomorrow morning-will transfer to [facility]-case management input is appreciated  2  Hypokalemia-will give oral and IV supplement  Subjective:   Patient without complaints of nausea or vomiting  He did eat some regular foods last night and willing to order breakfast-denies shortness of breath or lightheadedness  ROS  Comprehensive system review negative other than noted above    Objective:     Vitals: Blood pressure (!) 121/104, pulse 75, temperature (!) 97 2 °F (36 2 °C), temperature source Tympanic, resp  rate (!) 28, height 6' (1 829 m), weight 90 3 kg (199 lb 1 2 oz), SpO2 98 %  ,Body mass index is 27 kg/m²    Current Facility-Administered Medications   Medication Dose Route Frequency Provider Last Rate Last Dose    ALPRAZolam (XANAX) tablet 0 25 mg  0 25 mg Oral TID PRN Jace Hernandez MD   0 25 mg at 11/05/17 2343    enoxaparin (LOVENOX) subcutaneous injection 40 mg  40 mg Subcutaneous Q24H Shubham Mari MD   40 mg at 11/06/17 0830    insulin detemir (LEVEMIR) subcutaneous injection 15 Units  15 Units Subcutaneous Once Sarah Fly, DO        insulin detemir (LEVEMIR) subcutaneous injection 30 Units  30 Units Subcutaneous HS Sarah Fly, DO        insulin lispro (HumaLOG) 100 units/mL subcutaneous injection 1-5 Units  1-5 Units Subcutaneous TID AC Sarah Fly, DO        insulin lispro (HumaLOG) 100 units/mL subcutaneous injection 1-5 Units  1-5 Units Subcutaneous HS Sarah Fly, DO        insulin lispro (HumaLOG) 100 units/mL subcutaneous injection 8 Units  8 Units Subcutaneous TID With Meals Sarah Fly, DO        methadone (DOLOPHINE) tablet 115 mg  115 mg Oral Daily Sherry Estevez MD   115 mg at 11/06/17 0827    nicotine (NICODERM CQ) 21 mg/24 hr TD 24 hr patch 21 mg  21 mg Transdermal Daily Ok Leroy MD   21 mg at 11/06/17 0839    ondansetron (ZOFRAN) injection 4 mg  4 mg Intravenous Q4H PRN Jaja Watters MD   4 mg at 11/05/17 1242    pantoprazole (PROTONIX) injection 40 mg  40 mg Intravenous Q12H [facility] Jaja Watters MD   40 mg at 11/06/17 0831    promethazine (PHENERGAN) injection 25 mg  25 mg Intravenous Q4H PRN Jaja Watters MD   25 mg at 11/04/17 2220    sodium chloride (PF) 0 9 % injection 3 mL  3 mL Intravenous PRN ALESSIO Sosa         Prescriptions Prior to Admission   Medication    insulin aspart (NovoLOG) 100 units/mL injection    insulin detemir (LEVEMIR) 100 units/mL subcutaneous injection    methadone (DOLOPHINE) 10 MG/5ML solution         Intake/Output Summary (Last 24 hours) at 11/06/17 0842  Last data filed at 11/06/17 0240   Gross per 24 hour   Intake           3283 4 ml   Output             2925 ml   Net            358 4 ml       Physical Exam:  General appearance: alert, cooperative and no distress  Neck: no adenopathy, no JVD and supple, symmetrical, trachea midline  Lungs: clear to auscultation bilaterally  Heart: regular rate and rhythm, S1, S2 normal, no murmur, click, rub or gallop  Abdomen: soft, non-tender; bowel sounds normal; no masses,  no organomegaly  Extremities: extremities normal, atraumatic, no cyanosis or edema  Skin: Skin color, texture, turgor normal  No rashes or lesions  Neurologic: Grossly normal       Lab, Imaging and other studies: I have personally reviewed pertinent reports  Results from last 7 days  Lab Units 11/05/17  0601 11/04/17  2038 11/04/17  1700 11/01/17  0527 10/31/17  0420   WBC Thousand/uL 7 02  --  10 70* 4 60 3 63*   HEMOGLOBIN g/dL 12 0  --  15 5 15 2 15 2   I STAT HEMOGLOBIN g/dl  --  16 0  --   --   --    HEMATOCRIT % 35 6*  --  46 3 44 4 44 3   PLATELETS Thousands/uL 240  --  287 155 138*   NEUTROS PCT %  --   --  79* 49 58   LYMPHS PCT %  --   --  14 37 29   MONOS PCT %  --   --  6 11 10   EOS PCT %  --   --  0 2 2       Results from last 7 days  Lab Units 11/06/17  0443 11/05/17  0601 11/05/17  0101  11/04/17  1700 11/02/17  0643   SODIUM mmol/L 141 135* 136  < > 134* 139   POTASSIUM mmol/L 3 1* 3 7 4 8  < > 4 6 4 0   CHLORIDE mmol/L 109* 104 105  < > 93* 100   CO2 mmol/L 22 19* 14*  < > 12* 31   BUN mg/dL 3* 8 9  < > 14 9   CREATININE mg/dL 0 62 0 80 0 93  < > 1 14 0 76   CALCIUM mg/dL 7 7* 7 4* 7 2*  < > 9 2 9 3   TOTAL PROTEIN g/dL 5 6*  --   --   --  8 6* 7 4   BILIRUBIN TOTAL mg/dL 0 50  --   --   --  0 90 0 60   ALK PHOS U/L 83  --   --   --  157* 110   ALT U/L 173*  --   --   --  378* 463*   AST U/L 61*  --   --   --  155* 334*   GLUCOSE RANDOM mg/dL 136 200* 221*  < > 298* 96   GLUCOSE, ISTAT   --   --   --   < >  --   --    < > = values in this interval not displayed  Lab Results   Component Value Date    TROPONINI <0 02 11/04/2017    CKTOTAL 229 11/04/2017    CKTOTAL 55 11/02/2017       Results from last 7 days  Lab Units 11/04/17  1700   INR  1 00     Lab Results   Component Value Date    BLOODCX No Growth at 24 hrs  11/04/2017    BLOODCX No Growth at 24 hrs  11/04/2017       Imaging:  No results found for this or any previous visit  No results found for this or any previous visit  PATIENT CENTERED ROUNDS: I have performed rounds with the nursing staff            Bam Arizmendi DO

## 2017-11-06 NOTE — SOCIAL WORK
Met with patient discussed role of Care Management, patient resides alone in a 2nd floor apt  He is independent of ADL's  He is employed  He tried to get his Levimir prescription filled at the [facility] on [address] and was told it had to be precerted so it was not filled  [facility]  at [phone], and was informed physician  would have to state why Lantus can not being used  Discussed above with Dr Xochitl Brennan and patient could  go home on Lantus  Placed call to [facility] and Lantus is covered  And will be ready for  on Tuesday  Notified patient and he is agreeable

## 2017-11-07 VITALS
DIASTOLIC BLOOD PRESSURE: 97 MMHG | HEIGHT: 72 IN | OXYGEN SATURATION: 98 % | TEMPERATURE: 98.8 F | HEART RATE: 87 BPM | RESPIRATION RATE: 18 BRPM | SYSTOLIC BLOOD PRESSURE: 149 MMHG | WEIGHT: 198.41 LBS | BODY MASS INDEX: 26.87 KG/M2

## 2017-11-07 LAB
ANION GAP SERPL CALCULATED.3IONS-SCNC: 9 MMOL/L (ref 4–13)
BUN SERPL-MCNC: 4 MG/DL (ref 5–25)
CALCIUM SERPL-MCNC: 8.9 MG/DL (ref 8.3–10.1)
CHLORIDE SERPL-SCNC: 101 MMOL/L (ref 100–108)
CO2 SERPL-SCNC: 30 MMOL/L (ref 21–32)
CREAT SERPL-MCNC: 0.65 MG/DL (ref 0.6–1.3)
GFR SERPL CREATININE-BSD FRML MDRD: 123 ML/MIN/1.73SQ M
GLUCOSE SERPL-MCNC: 130 MG/DL (ref 65–140)
GLUCOSE SERPL-MCNC: 161 MG/DL (ref 65–140)
GLUCOSE SERPL-MCNC: 218 MG/DL (ref 65–140)
GLUCOSE SERPL-MCNC: 233 MG/DL (ref 65–140)
GLUCOSE SERPL-MCNC: 234 MG/DL (ref 65–140)
POTASSIUM SERPL-SCNC: 3.4 MMOL/L (ref 3.5–5.3)
SODIUM SERPL-SCNC: 140 MMOL/L (ref 136–145)

## 2017-11-07 PROCEDURE — 80048 BASIC METABOLIC PNL TOTAL CA: CPT | Performed by: INTERNAL MEDICINE

## 2017-11-07 PROCEDURE — 82948 REAGENT STRIP/BLOOD GLUCOSE: CPT

## 2017-11-07 RX ORDER — BISACODYL 10 MG
10 SUPPOSITORY, RECTAL RECTAL DAILY
Status: DISCONTINUED | OUTPATIENT
Start: 2017-11-07 | End: 2017-11-07 | Stop reason: HOSPADM

## 2017-11-07 RX ORDER — NICOTINE 21 MG/24HR
1 PATCH, TRANSDERMAL 24 HOURS TRANSDERMAL DAILY
Qty: 28 PATCH | Refills: 0 | Status: SHIPPED | OUTPATIENT
Start: 2017-11-07

## 2017-11-07 RX ORDER — PANTOPRAZOLE SODIUM 40 MG/1
40 TABLET, DELAYED RELEASE ORAL
Qty: 30 TABLET | Refills: 0 | Status: SHIPPED | OUTPATIENT
Start: 2017-11-08

## 2017-11-07 RX ORDER — POLYETHYLENE GLYCOL 3350 17 G/17G
17 POWDER, FOR SOLUTION ORAL ONCE
Status: COMPLETED | OUTPATIENT
Start: 2017-11-07 | End: 2017-11-07

## 2017-11-07 RX ORDER — PANTOPRAZOLE SODIUM 40 MG/1
40 TABLET, DELAYED RELEASE ORAL
Status: DISCONTINUED | OUTPATIENT
Start: 2017-11-07 | End: 2017-11-07 | Stop reason: HOSPADM

## 2017-11-07 RX ORDER — INSULIN GLARGINE 100 [IU]/ML
30 INJECTION, SOLUTION SUBCUTANEOUS
Qty: 10 ML | Refills: 0 | Status: SHIPPED | OUTPATIENT
Start: 2017-11-07

## 2017-11-07 RX ADMIN — METHADONE HYDROCHLORIDE 115 MG: 10 TABLET ORAL at 08:37

## 2017-11-07 RX ADMIN — INSULIN LISPRO 8 UNITS: 100 INJECTION, SOLUTION INTRAVENOUS; SUBCUTANEOUS at 08:48

## 2017-11-07 RX ADMIN — PANTOPRAZOLE SODIUM 40 MG: 40 TABLET, DELAYED RELEASE ORAL at 08:39

## 2017-11-07 RX ADMIN — POTASSIUM CHLORIDE 20 MEQ: 1500 TABLET, EXTENDED RELEASE ORAL at 08:39

## 2017-11-07 RX ADMIN — ENOXAPARIN SODIUM 40 MG: 40 INJECTION SUBCUTANEOUS at 08:40

## 2017-11-07 RX ADMIN — INSULIN LISPRO 8 UNITS: 100 INJECTION, SOLUTION INTRAVENOUS; SUBCUTANEOUS at 13:22

## 2017-11-07 RX ADMIN — ALPRAZOLAM 0.25 MG: 0.25 TABLET ORAL at 05:49

## 2017-11-07 RX ADMIN — POLYETHYLENE GLYCOL 3350 17 G: 17 POWDER, FOR SOLUTION ORAL at 10:02

## 2017-11-07 RX ADMIN — BISACODYL 10 MG: 10 SUPPOSITORY RECTAL at 13:17

## 2017-11-07 RX ADMIN — DOCUSATE SODIUM 100 MG: 100 CAPSULE, LIQUID FILLED ORAL at 08:37

## 2017-11-07 RX ADMIN — INSULIN LISPRO 2 UNITS: 100 INJECTION, SOLUTION INTRAVENOUS; SUBCUTANEOUS at 12:00

## 2017-11-07 RX ADMIN — NICOTINE 21 MG: 21 PATCH, EXTENDED RELEASE TRANSDERMAL at 08:36

## 2017-11-07 NOTE — DISCHARGE SUMMARY
Discharge Summary - Claudetta Murders 46 y o  male MRN: [id number]    Unit/Bed#: [address] [place] [address] Encounter: [id number]    Admission Date: 11/4/2017     Admitting Diagnosis: Anxiety [F41 9]  Nausea [R11 0]  Vomiting [R11 10]  Constipation [K59 00]  Narcotic dependency, continuous ([address] ) [F11 20]  Diabetic ketoacidosis without coma associated with type 1 diabetes mellitus ([address] ) [E10 10]  Type 1 diabetes mellitus with ketoacidotic coma ([address] ) [E10 11]    HPI:  46year-oldConsults for DKA 1 week prior to admission presented with recurrent nausea and vomiting after being without his long-acting insulin for a day and a half due to issues at the pharmacy  He presented with vomiting and had some blood streaking in his emesis    Procedures Performed: No orders of the defined types were placed in this encounter  Hospital Course:  Patient was admitted and placed on IV insulin for DKA and IV Protonix for his GI symptoms  Patient cleared his acidosis and was was switched to subcu insulin on on November 6, 2017  He was seen in consultation by Dr Mile Sainz, Endocrinology and will follow with him as an outpatient  Patient was discharged on oral Protonix and as he is moved to this area he is requesting to follow with [facility] as his primary care  Significant Findings, Care, Treatment and Services Provided:   Hepatobiliary w RX [[id number]] Collected: 11/01/17 1442   Order Status: Completed Updated: 11/01/17 1449   Narrative:     HEPATOBILIARY SCAN WITH CHOLECYSTOKININ ADMINISTRATION     INDICATION:  Abdominal pain, abnormal LFTs          COMPARISON:  Abdominal ultrasound of 10/30/2017    TECHNIQUE:   Following the intravenous administration of 5 3 mCi Tc-99m labeled mebrofenin, dynamic abdominal images were obtained over a 26minute time period   Images were performed in AP projection    The patient had some pain while on the table   therefore imaging was abbreviated in sections  FINDINGS:     There is prompt, uniform accumulation with normal clearance of the radiopharmaceutical by the liver  There is normal filling of the intrahepatic ducts, common bile duct and gallbladder with normal excretion of the radiopharmaceutical into the duodenum       In order to evaluate the contractile response of the gallbladder to  cholecystokinin stimulation, 1 8 mcg sincalide (0 02 mcg/kg) was administered by slow intravenous infusion over 60 minutes   Imaging was obtained dynamically for 17 minutes followed by   static images at, 23 minutes, 30 minutes and 40 minutes due to patient's pain on the table  These images demonstrate normal contraction of the gallbladder   The calculated gallbladder ejection fraction is 89 % (N = >35%)  The patient experienced no symptoms after CCK administration      Impression:       1  Normal hepatobiliary study  2  Normal contractile response of the gallbladder to cholecystokinin infusion   (Gallbladder ejection fraction of 89%)      Workstation performed: [id number]   US liver [[id number]] Collected: 10/30/17 1455   Order Status: Completed Updated: 10/30/17 1457   Narrative:     RIGHT UPPER QUADRANT ULTRASOUND    INDICATION:  Hepatitis  COMPARISON:  Abdomen and pelvic CT from 10/27/2017  TECHNIQUE:   Real-time ultrasound of the right upper quadrant was performed with a curvilinear transducer with both volumetric sweeps and still imaging techniques  FINDINGS:    PANCREAS:  Visualized portions of the pancreas are within normal limits  AORTA AND IVC:  Visualized portions are normal for patient age  LIVER:  Size:  Moderately enlarged   The liver measures 21 8 cm in the midclavicular line  Contour:  Surface contour is smooth  Parenchyma:  There is moderate diffuse increased echogenicity with smooth echotexture and acoustic beam attenuation   Most consistent with moderate hepatic steatosis  No evidence of suspicious mass    Limited imaging of the main portal vein shows it to be patent and hepatopetal      BILIARY:  The gallbladder is normal in caliber  No wall thickening or pericholecystic fluid  No stones or sludge identified  No sonographic Dave's sign  No intrahepatic biliary dilatation  CBD measures 6 mm  No choledocholithiasis  KIDNEY:   Right kidney measures 14 cm  Within normal limits  ASCITES:   None  Impression:       Moderate hepatomegaly and fatty liver change  Workstation performed: [id number]   CT abdomen pelvis with contrast [[id number]] Collected: 10/28/17 0513   Order Status: Completed Updated: 10/28/17 0517   Narrative:     CT ABDOMEN AND PELVIS WITH IV CONTRAST    INDICATION:  Right upper quadrant pain   Vomiting  COMPARISON: None  TECHNIQUE:  CT examination of the abdomen and pelvis was performed      Reformatted images were created in axial, sagittal, and coronal planes       Radiation dose length product (DLP) for this visit:  490 mGy-cm    This examination, like all CT scans performed in the [facility], was performed utilizing techniques to minimize radiation dose exposure, including the use of iterative   reconstruction and automated exposure control  IV Contrast:  50 mL of iohexol (OMNIPAQUE)       Enteric Contrast:  Enteric contrast was not administered  FINDINGS:    ABDOMEN    LOWER CHEST:  No significant abnormalities identified in the lower chest     LIVER/BILIARY TREE:  The liver is markedly hypodense consistent with fatty infiltration  GALLBLADDER:  No calcified gallstones  No pericholecystic inflammatory change  SPLEEN:  There are calcified granulomas in the spleen   Otherwise unremarkable  PANCREAS:  Unremarkable  ADRENAL GLANDS:  Unremarkable  KIDNEYS/URETERS:  Unremarkable  No hydronephrosis  STOMACH AND BOWEL:  Unremarkable  APPENDIX:  No findings to suggest appendicitis  ABDOMINOPELVIC CAVITY:  No ascites or free intraperitoneal air   No lymphadenopathy  VESSELS:  Unremarkable for patient's age  PELVIS    REPRODUCTIVE ORGANS:  Unremarkable for patient's age  URINARY BLADDER:  Unremarkable  ABDOMINAL WALL/INGUINAL REGIONS:  Unremarkable  OSSEOUS STRUCTURES:  No acute fracture or destructive osseous lesion  Impression:       No acute intra-abdominal abnormality  Marked hepatic steatosis  General appearance: alert, cooperative and no distress  Neck: no adenopathy, no JVD and thyroid not enlarged, symmetric, no tenderness/mass/nodules  Lungs: clear to auscultation bilaterally  Heart: regular rate and rhythm, S1, S2 normal, no murmur, click, rub or gallop  Abdomen: No guarding or rigidity  Minimal midepigastric tenderness  Extremities: extremities normal, atraumatic, no cyanosis or edema  Skin: Skin color, texture, turgor normal  No rashes or lesions  Neurologic: Grossly normal       Complications: none    Discharge Diagnosis: Principal Problem:    Diabetic ketoacidosis without coma associated with type 1 diabetes mellitus ([facility] )  Active Problems:    Narcotic dependency, continuous ([facility] )    Fatty liver    Hypokalemia        Condition at Discharge: good     Discharge instructions/Information to patient and family:   See after visit summary for information provided to patient and family  Provisions for Follow-Up Care:  See after visit summary for information related to follow-up care and any pertinent home health orders  Disposition: Home    Planned Readmission: No    Discharge Statement   I spent 30 minutes discharging the patient  This time was spent on the day of discharge  I had direct contact with the patient on the day of discharge  Discharge Medications:  See after visit summary for reconciled discharge medications provided to patient and family          Jessica Cerna DO

## 2017-11-07 NOTE — NURSING NOTE
Had a bm prior to d/c  Carroll Dick All instructions reviewed w pt denies nausea  the patient went ambulatory to home  Carroll Dick

## 2017-11-07 NOTE — PROGRESS NOTES
Slight nausea this am but is robert meals    Seen by dr mcclain and is discharged    hasnt moved is bowels since last week when was a pt    abd soft  And BS positive   miralax and dulcolax supp gv

## 2017-11-07 NOTE — PROGRESS NOTES
Progress Note - Naveen Stark 46 y o  male MRN: [id number]    Unit/Bed#: [address] [place] [address] [id number] Encounter: [id number]      CC: diabetes f/u    Subjective:   Naveen Stark is a 46y o  year old male with type 1 diabetes  Feels well  Appetite isn't at baseline, but is improved since admission  Sugar went up around midnight, but pt denies bedtime snack  Objective:     Vitals: Blood pressure 149/97, pulse 87, temperature 98 8 °F (37 1 °C), temperature source Oral, resp  rate 18, height 6' (1 829 m), weight 90 kg (198 lb 6 6 oz), SpO2 98 %  ,Body mass index is 26 91 kg/m²  No intake or output data in the 24 hours ending 11/07/17 1220    Physical Exam:  General: No acute distress  Alert, awake, and oriented x3  HEENT: Normocephalic, atraumatic  Heart: Regular rate and rhythm  Lungs: Clear  No respiratory distress  Abdomen: ND/NTTP  Extremities: No edema  Skin: Warm, dry  No rash  Neuro: Moves all 4 extremities spontaneously  Psych: Appropriate mood and affect  Cooperative  Lab, Imaging and other studies: I have personally reviewed pertinent reports  POC Glucose (mg/dl)   Date Value   11/07/2017 130   11/07/2017 234 (H)   11/06/2017 138   11/06/2017 220 (H)   11/06/2017 178 (H)   11/06/2017 324 (H)   11/06/2017 156 (H)   11/06/2017 93   11/06/2017 124   11/06/2017 115       Assessment:  DM 1 with DKA, hyperglycemia and long term insulin use    Plan:  Continue current regimen of Lantus 30 qhs, Humalog 8 ac plus scale  May need more mealtime insulin, but will wait on prelunch value  Continue to monitor and adjust as needed  Follow up as outpatient  Information given for him to call and schedule appointment with Endocrinology  OK for discharge from my standpoint

## 2017-11-09 LAB
BACTERIA BLD CULT: NORMAL
BACTERIA BLD CULT: NORMAL

## (undated) DEVICE — 1200CC GUARDIAN II: Brand: GUARDIAN

## (undated) DEVICE — BITE BLOCK ADULT 11FR OMNI BLOC

## (undated) DEVICE — SINGLE-USE BIOPSY FORCEPS: Brand: RADIAL JAW 4

## (undated) DEVICE — TUBING SUCTION 5MM X 12 FT

## (undated) DEVICE — SYRINGE 50ML LL

## (undated) DEVICE — MEDI-VAC YANKAUER SUCTION HANDLE W/STRAIGHT TIP & CONTROL VENT: Brand: CARDINAL HEALTH

## (undated) DEVICE — SYRINGE 30ML LL